# Patient Record
Sex: MALE | Race: WHITE | Employment: OTHER | URBAN - METROPOLITAN AREA
[De-identification: names, ages, dates, MRNs, and addresses within clinical notes are randomized per-mention and may not be internally consistent; named-entity substitution may affect disease eponyms.]

---

## 2019-08-18 ENCOUNTER — APPOINTMENT (OUTPATIENT)
Dept: GENERAL RADIOLOGY | Facility: HOSPITAL | Age: 69
DRG: 570 | End: 2019-08-18
Attending: EMERGENCY MEDICINE
Payer: MEDICARE

## 2019-08-18 ENCOUNTER — HOSPITAL ENCOUNTER (INPATIENT)
Facility: HOSPITAL | Age: 69
LOS: 7 days | Discharge: NURSING, REHAB OR LONG-TERM CARE | DRG: 570 | End: 2019-08-25
Attending: EMERGENCY MEDICINE | Admitting: INTERNAL MEDICINE
Payer: MEDICARE

## 2019-08-18 DIAGNOSIS — L03.90 CELLULITIS, UNSPECIFIED CELLULITIS SITE: ICD-10-CM

## 2019-08-18 DIAGNOSIS — R53.1 WEAKNESS GENERALIZED: ICD-10-CM

## 2019-08-18 DIAGNOSIS — L97.512 SKIN ULCER OF BOTH FEET WITH FAT LAYER EXPOSED (HCC): Primary | ICD-10-CM

## 2019-08-18 DIAGNOSIS — Z74.01 BEDRIDDEN: ICD-10-CM

## 2019-08-18 DIAGNOSIS — L02.611 ABSCESS OF RIGHT FOOT: ICD-10-CM

## 2019-08-18 DIAGNOSIS — D64.9 ANEMIA, UNSPECIFIED TYPE: ICD-10-CM

## 2019-08-18 DIAGNOSIS — M06.9 RHEUMATOID ARTHRITIS, INVOLVING UNSPECIFIED SITE, UNSPECIFIED RHEUMATOID FACTOR PRESENCE: ICD-10-CM

## 2019-08-18 DIAGNOSIS — E86.0 DEHYDRATION: ICD-10-CM

## 2019-08-18 DIAGNOSIS — L97.522 SKIN ULCER OF BOTH FEET WITH FAT LAYER EXPOSED (HCC): Primary | ICD-10-CM

## 2019-08-18 DIAGNOSIS — N30.00 ACUTE CYSTITIS WITHOUT HEMATURIA: ICD-10-CM

## 2019-08-18 DIAGNOSIS — R00.1 BRADYCARDIA: ICD-10-CM

## 2019-08-18 PROBLEM — R79.89 AZOTEMIA: Status: ACTIVE | Noted: 2019-08-18

## 2019-08-18 PROBLEM — R73.9 HYPERGLYCEMIA: Status: ACTIVE | Noted: 2019-08-18

## 2019-08-18 LAB
ALBUMIN SERPL-MCNC: 2.5 G/DL (ref 3.4–5)
ALBUMIN/GLOB SERPL: 0.7 {RATIO} (ref 1–2)
ALP LIVER SERPL-CCNC: 131 U/L (ref 45–117)
ALT SERPL-CCNC: 29 U/L (ref 16–61)
ANION GAP SERPL CALC-SCNC: 5 MMOL/L (ref 0–18)
ANION GAP SERPL CALC-SCNC: 6 MMOL/L (ref 0–18)
AST SERPL-CCNC: 30 U/L (ref 15–37)
BASOPHILS # BLD AUTO: 0.01 X10(3) UL (ref 0–0.2)
BASOPHILS NFR BLD AUTO: 0.2 %
BILIRUB SERPL-MCNC: 0.4 MG/DL (ref 0.1–2)
BILIRUB UR QL: NEGATIVE
BUN BLD-MCNC: 29 MG/DL (ref 7–18)
BUN BLD-MCNC: 30 MG/DL (ref 7–18)
BUN/CREAT SERPL: 34.1 (ref 10–20)
BUN/CREAT SERPL: 35.3 (ref 10–20)
CALCIUM BLD-MCNC: 9.1 MG/DL (ref 8.5–10.1)
CALCIUM BLD-MCNC: 9.2 MG/DL (ref 8.5–10.1)
CHLORIDE SERPL-SCNC: 108 MMOL/L (ref 98–112)
CHLORIDE SERPL-SCNC: 109 MMOL/L (ref 98–112)
CO2 SERPL-SCNC: 28 MMOL/L (ref 21–32)
CO2 SERPL-SCNC: 29 MMOL/L (ref 21–32)
COLOR UR: YELLOW
CREAT BLD-MCNC: 0.85 MG/DL (ref 0.7–1.3)
CREAT BLD-MCNC: 0.85 MG/DL (ref 0.7–1.3)
CRP SERPL-MCNC: 1.68 MG/DL (ref ?–0.3)
DEPRECATED RDW RBC AUTO: 71.8 FL (ref 35.1–46.3)
EOSINOPHIL # BLD AUTO: 0.02 X10(3) UL (ref 0–0.7)
EOSINOPHIL NFR BLD AUTO: 0.4 %
ERYTHROCYTE [DISTWIDTH] IN BLOOD BY AUTOMATED COUNT: 20.3 % (ref 11–15)
ERYTHROCYTE [SEDIMENTATION RATE] IN BLOOD: 90 MM/HR (ref 0–20)
GLOBULIN PLAS-MCNC: 3.8 G/DL (ref 2.8–4.4)
GLUCOSE BLD-MCNC: 113 MG/DL (ref 70–99)
GLUCOSE BLD-MCNC: 114 MG/DL (ref 70–99)
GLUCOSE BLDC GLUCOMTR-MCNC: 180 MG/DL (ref 70–99)
GLUCOSE BLDC GLUCOMTR-MCNC: 62 MG/DL (ref 70–99)
GLUCOSE UR-MCNC: NEGATIVE MG/DL
HCT VFR BLD AUTO: 29.9 % (ref 39–53)
HGB BLD-MCNC: 9.2 G/DL (ref 13–17.5)
HGB UR QL STRIP.AUTO: NEGATIVE
IMM GRANULOCYTES # BLD AUTO: 0.01 X10(3) UL (ref 0–1)
IMM GRANULOCYTES NFR BLD: 0.2 %
KETONES UR-MCNC: NEGATIVE MG/DL
LYMPHOCYTES # BLD AUTO: 0.57 X10(3) UL (ref 1–4)
LYMPHOCYTES NFR BLD AUTO: 10.1 %
M PROTEIN MFR SERPL ELPH: 6.3 G/DL (ref 6.4–8.2)
MCH RBC QN AUTO: 31 PG (ref 26–34)
MCHC RBC AUTO-ENTMCNC: 30.8 G/DL (ref 31–37)
MCV RBC AUTO: 100.7 FL (ref 80–100)
MONOCYTES # BLD AUTO: 0.38 X10(3) UL (ref 0.1–1)
MONOCYTES NFR BLD AUTO: 6.7 %
NEUTROPHILS # BLD AUTO: 4.66 X10 (3) UL (ref 1.5–7.7)
NEUTROPHILS # BLD AUTO: 4.66 X10(3) UL (ref 1.5–7.7)
NEUTROPHILS NFR BLD AUTO: 82.4 %
NITRITE UR QL STRIP.AUTO: NEGATIVE
OSMOLALITY SERPL CALC.SUM OF ELEC: 301 MOSM/KG (ref 275–295)
OSMOLALITY SERPL CALC.SUM OF ELEC: 303 MOSM/KG (ref 275–295)
PH UR: 6 [PH] (ref 5–8)
PLATELET # BLD AUTO: 173 10(3)UL (ref 150–450)
PLATELET MORPHOLOGY: NORMAL
POTASSIUM SERPL-SCNC: 4.5 MMOL/L (ref 3.5–5.1)
POTASSIUM SERPL-SCNC: 4.5 MMOL/L (ref 3.5–5.1)
PROT UR-MCNC: 30 MG/DL
RBC # BLD AUTO: 2.97 X10(6)UL (ref 3.8–5.8)
RBC #/AREA URNS AUTO: 2 /HPF
SODIUM SERPL-SCNC: 142 MMOL/L (ref 136–145)
SODIUM SERPL-SCNC: 143 MMOL/L (ref 136–145)
SP GR UR STRIP: 1.02 (ref 1–1.03)
UROBILINOGEN UR STRIP-ACNC: <2
VIT C UR-MCNC: 40 MG/DL
WBC # BLD AUTO: 5.7 X10(3) UL (ref 4–11)
WBC #/AREA URNS AUTO: 42 /HPF

## 2019-08-18 PROCEDURE — 73610 X-RAY EXAM OF ANKLE: CPT | Performed by: EMERGENCY MEDICINE

## 2019-08-18 RX ORDER — DEXTROSE MONOHYDRATE 25 G/50ML
INJECTION, SOLUTION INTRAVENOUS
Status: COMPLETED
Start: 2019-08-18 | End: 2019-08-18

## 2019-08-18 RX ORDER — MULTIVIT WITH MINERALS/LUTEIN
2000 TABLET ORAL DAILY
COMMUNITY

## 2019-08-18 RX ORDER — GARLIC EXTRACT 500 MG
1 CAPSULE ORAL 2 TIMES DAILY
COMMUNITY

## 2019-08-18 RX ORDER — METHYLPREDNISOLONE 4 MG
4 TABLET, DOSE PACK ORAL DAILY
COMMUNITY

## 2019-08-18 NOTE — ED PROVIDER NOTES
Patient Seen in: [facility] AND [facility] Emergency Department    History   Patient presents with:  Wound    Stated Complaint: WORSENING FOOT ULCERS    HPI    Patient presents the emergency department today with multiple problems.   He has increasing pain and ul SpO2 96%   BMI 18.32 kg/m²         Physical Exam   Constitutional: He is oriented to person, place, and time. He appears well-developed and well-nourished. No distress. HENT:   Head: Normocephalic.    Eyes: Conjunctivae and EOM are normal.   Neck: Dawna Music Abnormal; Notable for the following components:    Sed Rate 90 (*)     All other components within normal limits   RBC MORPHOLOGY SCAN - Abnormal; Notable for the following components:    RBC Morphology See morphology below (*)     All other components wit the following components:    Vitamin B12 >2,000 (*)     All other components within normal limits   BASIC METABOLIC PANEL (8) - Abnormal; Notable for the following components:    Sodium 146 (*)     Potassium 5.2 (*)     BUN 24 (*)     BUN/CREA Ratio 33.8 ( the following components:    POC Glucose  138 (*)     All other components within normal limits   POCT GLUCOSE - Abnormal; Notable for the following components:    POC Glucose  155 (*)     All other components within normal limits   POCT GLUCOSE - Abnormal components within normal limits   AEROBIC BACTERIAL CULTURE - Abnormal; Notable for the following components:    Aerobic Culture Result 3+ growth Staphylococcus aureus, MRSA (*)     Aerobic Culture Result Gram Negative Alphonso (*)     All other components with orders. CBC WITH DIFFERENTIAL WITH PLATELET    Narrative: The following orders were created for panel order CBC WITH DIFFERENTIAL WITH PLATELET.   Procedure                               Abnormality         Status                     --------- need for follow up      Admission disposition: 8/18/2019  6:50 PM         Discussed W/ Dr. Mari Hayes. Will give Rocephin for UTI.  Admit to monitored bed for bradycardia    EKG    Indication: weakness  Rhythm: sinus bradycardia  Comparison: No old EKG for Comp (Principal) Skin ulcer of both feet with fat layer exposed ([facility] [address] L97.512, L97.522 8/18/2019 Yes    Abscess of right foot L02.611 Unknown Yes    Acute cystitis without hematuria N30.00 8/18/2019 Yes    Anemia, unspecified type D64.9 8/18/2019 Yes    Azotemi

## 2019-08-19 LAB
ANION GAP SERPL CALC-SCNC: 2 MMOL/L (ref 0–18)
BASOPHILS # BLD AUTO: 0.01 X10(3) UL (ref 0–0.2)
BASOPHILS NFR BLD AUTO: 0.2 %
BUN BLD-MCNC: 28 MG/DL (ref 7–18)
BUN/CREAT SERPL: 35 (ref 10–20)
CALCIUM BLD-MCNC: 8.8 MG/DL (ref 8.5–10.1)
CHLORIDE SERPL-SCNC: 112 MMOL/L (ref 98–112)
CO2 SERPL-SCNC: 31 MMOL/L (ref 21–32)
CREAT BLD-MCNC: 0.8 MG/DL (ref 0.7–1.3)
DEPRECATED RDW RBC AUTO: 74.6 FL (ref 35.1–46.3)
EOSINOPHIL # BLD AUTO: 0.02 X10(3) UL (ref 0–0.7)
EOSINOPHIL NFR BLD AUTO: 0.4 %
ERYTHROCYTE [DISTWIDTH] IN BLOOD BY AUTOMATED COUNT: 20.5 % (ref 11–15)
EST. AVERAGE GLUCOSE BLD GHB EST-MCNC: 94 MG/DL (ref 68–126)
GLUCOSE BLD-MCNC: 70 MG/DL (ref 70–99)
GLUCOSE BLDC GLUCOMTR-MCNC: 107 MG/DL (ref 70–99)
GLUCOSE BLDC GLUCOMTR-MCNC: 111 MG/DL (ref 70–99)
GLUCOSE BLDC GLUCOMTR-MCNC: 112 MG/DL (ref 70–99)
GLUCOSE BLDC GLUCOMTR-MCNC: 79 MG/DL (ref 70–99)
HBA1C MFR BLD HPLC: 4.9 % (ref ?–5.7)
HCT VFR BLD AUTO: 27.9 % (ref 39–53)
HGB BLD-MCNC: 8.6 G/DL (ref 13–17.5)
IMM GRANULOCYTES # BLD AUTO: 0.02 X10(3) UL (ref 0–1)
IMM GRANULOCYTES NFR BLD: 0.4 %
LYMPHOCYTES # BLD AUTO: 0.44 X10(3) UL (ref 1–4)
LYMPHOCYTES NFR BLD AUTO: 9.3 %
MCH RBC QN AUTO: 31.2 PG (ref 26–34)
MCHC RBC AUTO-ENTMCNC: 30.8 G/DL (ref 31–37)
MCV RBC AUTO: 101.1 FL (ref 80–100)
MONOCYTES # BLD AUTO: 0.37 X10(3) UL (ref 0.1–1)
MONOCYTES NFR BLD AUTO: 7.8 %
NEUTROPHILS # BLD AUTO: 3.86 X10 (3) UL (ref 1.5–7.7)
NEUTROPHILS # BLD AUTO: 3.86 X10(3) UL (ref 1.5–7.7)
NEUTROPHILS NFR BLD AUTO: 81.9 %
OSMOLALITY SERPL CALC.SUM OF ELEC: 304 MOSM/KG (ref 275–295)
PLATELET # BLD AUTO: 158 10(3)UL (ref 150–450)
POTASSIUM SERPL-SCNC: 4.7 MMOL/L (ref 3.5–5.1)
RBC # BLD AUTO: 2.76 X10(6)UL (ref 3.8–5.8)
SODIUM SERPL-SCNC: 145 MMOL/L (ref 136–145)
TSI SER-ACNC: 11.9 MIU/ML (ref 0.36–3.74)
WBC # BLD AUTO: 4.7 X10(3) UL (ref 4–11)

## 2019-08-19 RX ORDER — HEPARIN SODIUM 5000 [USP'U]/ML
5000 INJECTION, SOLUTION INTRAVENOUS; SUBCUTANEOUS EVERY 12 HOURS SCHEDULED
Status: DISCONTINUED | OUTPATIENT
Start: 2019-08-19 | End: 2019-08-25

## 2019-08-19 RX ORDER — GARLIC EXTRACT 500 MG
1 CAPSULE ORAL 2 TIMES DAILY
Status: DISCONTINUED | OUTPATIENT
Start: 2019-08-19 | End: 2019-08-25

## 2019-08-19 RX ORDER — SODIUM CHLORIDE 9 MG/ML
INJECTION, SOLUTION INTRAVENOUS CONTINUOUS
Status: DISCONTINUED | OUTPATIENT
Start: 2019-08-19 | End: 2019-08-20

## 2019-08-19 NOTE — WOUND PROGRESS NOTE
Spoke to Dr. Renteria Ser re wound consult/recs and need for podiatry consult and to advise cultures were taken. MD asked me to call Dr. Tanya Gilbert which I did.  Voice mail message left for podiatry consult/need for toenail clipping/possible wound margin biopsy to

## 2019-08-19 NOTE — H&P
[address] Patient Status:  Inpatient    1/15/1950 MRN [id number]   Location [facility] 3W/SW Attending Abdi Mclaughlin MD   [facility] Day # 1 PCP Farideh Ventura MD, MD     Note entered Switch to definitive coverage as indicated, narrow spectrum and oral if possible, pending report on results of urine culture. Genital •    CV •    Lymph •    Heme • Anemia - Macrocytic. Present prior to rehydration.   Anticipate worsening by hemodilution

## 2019-08-19 NOTE — ED NOTES
Orders for admission, patient is aware of plan and ready to go upstairs.  Any questions, please call ED RN Myra Grewal at extension [phone]

## 2019-08-19 NOTE — WOUND PROGRESS NOTE
WOUND CARE NOTE      PLAN   Recommendations:  May consider surgical evaluation  Dietary consult for recommendations for nutrition to optimize wound healing  Diabetic Education for optimal glucose control  Turn schedules  Heels elevated using pillowsallan was used to locate pedal pulses which were auscultated on the left but could not be located on the right. Pt declined posterior assessment.  Dr. Krystal Khan paged to discuss wound recommendations: Santyl to bilateral ankle ulcers, xeroform to the skin tears, bi

## 2019-08-19 NOTE — PLAN OF CARE
Problem: Patient Centered Care  Goal: Patient preferences are identified and integrated in the patient's plan of care  Description  Interventions:  - What would you like us to know as we care for you? Wheelchair bound at home with sister and mother.   - P for physical needs  - Identify cognitive and physical deficits and behaviors that affect risk of falls.   - [place] fall precautions as indicated by assessment.  - Educate pt/family on patient safety including physical limitations  - Instruct pt to call f Angina  - Evaluate fluid balance, assess for edema, trend weights  Outcome: Progressing  Goal: Absence of cardiac arrhythmias or at baseline  Description  INTERVENTIONS:  - Continuous cardiac monitoring, monitor vital signs, obtain 12 lead EKG if indicated needed  - Follow urinary retention protocol/standard of care  - Consider collaborating with pharmacy to review patient's medication profile  - Implement strategies to promote bladder emptying  Outcome: Progressing     Problem: METABOLIC/FLUID AND ELECTROLY patients to use assistive/communication devices  Outcome: Progressing     Problem: SKIN/TISSUE INTEGRITY - ADULT  Goal: Skin integrity remains intact  Description  INTERVENTIONS  - Assess and document risk factors for pressure ulcer development  - Assess a

## 2019-08-19 NOTE — PLAN OF CARE
Heart rate WNL, IV fluids, IV abx, turn q2h, educated pt to use call light for assistance.     Problem: Patient Centered Care  Goal: Patient preferences are identified and integrated in the patient's plan of care  Description  Interventions:  - What would y response  - Implement non-pharmacological measures as appropriate and evaluate response  - Consider cultural and social influences on pain and pain management  - Manage/alleviate anxiety  - Utilize distraction and/or relaxation techniques  - Monitor for op system  Outcome: Progressing     Problem: CARDIOVASCULAR - ADULT  Goal: Maintains optimal cardiac output and hemodynamic stability  Description  INTERVENTIONS:  - Monitor vital signs, rhythm, and trends  - Monitor for bleeding, hypotension and signs of dec prevention bundle as indicated  Outcome: Progressing     Problem: Impaired Activities of Daily Living  Goal: Achieve highest/safest level of independence in self care  Description  Interventions:  - Assess ability and encourage patient to participate in AD

## 2019-08-19 NOTE — PLAN OF CARE
Problem: Patient Centered Care  Goal: Patient preferences are identified and integrated in the patient's plan of care  Description  Interventions:  - What would you like us to know as we care for you?  Patient wants to be kept updated on POC  - Provide ti influences on pain and pain management  - Manage/alleviate anxiety  - Utilize distraction and/or relaxation techniques  - Monitor for opioid side effects  - Notify MD/LIP if interventions unsuccessful or patient reports new pain  - Anticipate increased naomi stability  Description  INTERVENTIONS:  - Monitor vital signs, rhythm, and trends  - Monitor for bleeding, hypotension and signs of decreased cardiac output  - Evaluate effectiveness of vasoactive medications to optimize hemodynamic stability  - Monitor ar Evaluate effectiveness of GI medications  - Encourage mobilization and activity  - Obtain nutritional consult as needed  - Establish a toileting routine/schedule  - Consider collaborating with pharmacy to review patient's medication profile  Outcome: Progr development  - Assess and document skin integrity  - Monitor for areas of redness and/or skin breakdown  - Initiate interventions, skin care algorithm/standards of care as needed  Outcome: Progressing  Goal: Incision(s), wounds(s) or drain site(s) healing ADLs such as feeding, grooming, and bathing  - Educate and encourage patient/family in tolerated functional activity level and precautions during self-care  Outcome: Progressing     Problem: CARDIOVASCULAR - ADULT  Goal: Absence of cardiac arrhythmias or a

## 2019-08-19 NOTE — CONSULTS
[facility]    Report of Consultation    Mai Kapadia Patient Status:  Inpatient    1/15/1950 MRN [id number]   Location [facility] 3W/SW Attending Jaswant Grewal MD   [address] Day # 1 PCP Yi Sagastume MD, MD     Date of Admission:   Units by mouth daily. Calcium-Magnesium-Zinc 500-250-12.5 MG Oral Tab Take 3 tablets by mouth daily. Glucosamine Sulfate 500 MG Oral Tab Take 4 tablets by mouth daily. Acidophilus/Pectin Oral Cap Take 1 capsule by mouth 2 (two) times daily.        All 8.8 08/19/2019    ALB 2.5 (L) 08/18/2019    ALKPHO 131 (H) 08/18/2019    TP 6.3 (L) 08/18/2019    AST 30 08/18/2019    ALT 29 08/18/2019    TSH 11.900 (H) 08/19/2019    ESRML 90 (H) 08/18/2019    CRP 1.68 (H) 08/18/2019         Imaging:  Xr Ankle (min 3 Vi

## 2019-08-20 ENCOUNTER — APPOINTMENT (OUTPATIENT)
Dept: CV DIAGNOSTICS | Facility: HOSPITAL | Age: 69
DRG: 570 | End: 2019-08-20
Attending: INTERNAL MEDICINE
Payer: MEDICARE

## 2019-08-20 LAB
ALBUMIN SERPL-MCNC: 2 G/DL (ref 3.4–5)
ALBUMIN/GLOB SERPL: 0.5 {RATIO} (ref 1–2)
ALP LIVER SERPL-CCNC: 122 U/L (ref 45–117)
ALT SERPL-CCNC: 21 U/L (ref 16–61)
ANION GAP SERPL CALC-SCNC: 4 MMOL/L (ref 0–18)
AST SERPL-CCNC: 19 U/L (ref 15–37)
BILIRUB SERPL-MCNC: 0.3 MG/DL (ref 0.1–2)
BUN BLD-MCNC: 19 MG/DL (ref 7–18)
BUN/CREAT SERPL: 31.1 (ref 10–20)
CALCIUM BLD-MCNC: 8.4 MG/DL (ref 8.5–10.1)
CHLORIDE SERPL-SCNC: 114 MMOL/L (ref 98–112)
CO2 SERPL-SCNC: 28 MMOL/L (ref 21–32)
CREAT BLD-MCNC: 0.61 MG/DL (ref 0.7–1.3)
DEPRECATED RDW RBC AUTO: 77 FL (ref 35.1–46.3)
ERYTHROCYTE [DISTWIDTH] IN BLOOD BY AUTOMATED COUNT: 21.2 % (ref 11–15)
GLOBULIN PLAS-MCNC: 3.7 G/DL (ref 2.8–4.4)
GLUCOSE BLD-MCNC: 70 MG/DL (ref 70–99)
GLUCOSE BLDC GLUCOMTR-MCNC: 122 MG/DL (ref 70–99)
GLUCOSE BLDC GLUCOMTR-MCNC: 138 MG/DL (ref 70–99)
GLUCOSE BLDC GLUCOMTR-MCNC: 65 MG/DL (ref 70–99)
GLUCOSE BLDC GLUCOMTR-MCNC: 70 MG/DL (ref 70–99)
HCT VFR BLD AUTO: 27.8 % (ref 39–53)
HGB BLD-MCNC: 8.4 G/DL (ref 13–17.5)
HGB RETIC QN AUTO: 31 PG (ref 28.2–36.6)
IMM RETICS NFR: 0.07 RATIO (ref 0.1–0.3)
IRON SATURATION: 34 % (ref 20–50)
IRON SERPL-MCNC: 98 UG/DL (ref 65–175)
M PROTEIN MFR SERPL ELPH: 5.7 G/DL (ref 6.4–8.2)
MCH RBC QN AUTO: 30.5 PG (ref 26–34)
MCHC RBC AUTO-ENTMCNC: 30.2 G/DL (ref 31–37)
MCV RBC AUTO: 101.1 FL (ref 80–100)
OSMOLALITY SERPL CALC.SUM OF ELEC: 303 MOSM/KG (ref 275–295)
PLATELET # BLD AUTO: 148 10(3)UL (ref 150–450)
POTASSIUM SERPL-SCNC: 4.3 MMOL/L (ref 3.5–5.1)
RBC # BLD AUTO: 2.75 X10(6)UL (ref 3.8–5.8)
RETICS # AUTO: 46.5 X10(3) UL (ref 22.5–147.5)
RETICS/RBC NFR AUTO: 1.6 % (ref 0.5–2.5)
SODIUM SERPL-SCNC: 146 MMOL/L (ref 136–145)
TOTAL IRON BINDING CAPACITY: 289 UG/DL (ref 240–450)
TRANSFERRIN SERPL-MCNC: 194 MG/DL (ref 200–360)
VIT B12 SERPL-MCNC: >2000 PG/ML (ref 193–986)
WBC # BLD AUTO: 3.5 X10(3) UL (ref 4–11)

## 2019-08-20 PROCEDURE — 93306 TTE W/DOPPLER COMPLETE: CPT | Performed by: INTERNAL MEDICINE

## 2019-08-20 PROCEDURE — 99221 1ST HOSP IP/OBS SF/LOW 40: CPT | Performed by: PODIATRIST

## 2019-08-20 RX ORDER — DEXTROSE AND SODIUM CHLORIDE 5; .45 G/100ML; G/100ML
INJECTION, SOLUTION INTRAVENOUS CONTINUOUS
Status: DISCONTINUED | OUTPATIENT
Start: 2019-08-20 | End: 2019-08-22

## 2019-08-20 RX ORDER — LEVOTHYROXINE SODIUM 0.05 MG/1
50 TABLET ORAL
Status: DISCONTINUED | OUTPATIENT
Start: 2019-08-20 | End: 2019-08-25

## 2019-08-20 NOTE — PROGRESS NOTES
Progress note    24 h events kaitlynn with PACs         Current Medications:    Current Facility-Administered Medications:  Levothyroxine Sodium (SYNTHROID) tab 50 mcg 50 mcg Oral Before breakfast   dextrose 5 %-0.45 % NaCl infusion  Intravenous Continuous Date    WBC 4.7 08/19/2019    HGB 8.6 (L) 08/19/2019    HCT 27.9 (L) 08/19/2019    .0 08/19/2019    CREATSERUM 0.80 08/19/2019    BUN 28 (H) 08/19/2019     08/19/2019    K 4.7 08/19/2019     08/19/2019    CO2 31.0 08/19/2019    GLU 70 08 patient.     Adan Carter  8/19/2019

## 2019-08-20 NOTE — PLAN OF CARE
Problem: Patient Centered Care  Goal: Patient preferences are identified and integrated in the patient's plan of care  Description  Interventions:  - What would you like us to know as we care for you?  Right-handed but need things (phone, belongings) clos Consider cultural and social influences on pain and pain management  - Manage/alleviate anxiety  - Utilize distraction and/or relaxation techniques  - Monitor for opioid side effects  - Notify MD/LIP if interventions unsuccessful or patient reports new naomi output and hemodynamic stability  Description  INTERVENTIONS:  - Monitor vital signs, rhythm, and trends  - Monitor for bleeding, hypotension and signs of decreased cardiac output  - Evaluate effectiveness of vasoactive medications to optimize hemodynamic document skin integrity  - Assess and document dressing/incision, wound bed, drain sites and surrounding tissue  - Implement wound care per orders  - Initiate isolation precautions as appropriate  - Initiate Pressure Ulcer prevention bundle as indicated  O

## 2019-08-20 NOTE — CONSULTS
[facility]    Report of Consultation    Kayla Tavarez Patient Status:  Inpatient    1/15/1950 MRN [id number]   Location [facility] 3W/SW Attending Haydee Libman, MD   [facility] Day # 2 PCP Dominguez Sweet MD, MD     Date of Admission:   (ROCEPHIN) 1 g in sodium chloride 0.9% 100 mL MBP/ADD-vantage, 1 g, Intravenous, Q24H  •  collagenase (SANTYL) 250 UNIT/GM ointment, , Topical, Daily    Review of Systems:  Musculoskeletal:negative, the patient is contracted on his right foot left also has prominence. Correlate clinically. Dictated by (CST): Eve Dodson MD on 8/18/2019 at 18:28     Approved by (CST): Eve Dodson MD on 8/18/2019 at 18:32          Xr Ankle (min 3 Views), Right (cpt=73610)    Result Date: 8/18/2019  CONCLUSION:  1.  No the development of these wounds although they appear to be stasis in origin at least the larger ones. The ulcer on the plantar medial right foot seems to be from pressure and/or injury.   After discussing with Dr. Chele Gan I will have Dr. Martín Spangler consulted

## 2019-08-20 NOTE — DIETARY NOTE
ADULT NUTRITION INITIAL ASSESSMENT    Pt is at high nutrition risk. Pt meets severe malnutrition criteria.       CRITERIA FOR MALNUTRITION DIAGNOSIS:  Criteria for severe malnutrition diagnosis: chronic illness related to body fat severe depletion and musc Ca-Mag-Zn, & glucosamine at home.   Probiotic in hospital.  - Feeding assistance: meal set up  - Nutrition education: assess education needs  - Coordination of nutrition care: collaboration with other providers  - Discharge and transfer of nutrition care to yogurt & cheese in moderation.   Cultural/Ethnic/Episcopalian Preferences: None    MEDICATIONS: reviewed  • Levothyroxine Sodium  50 mcg Oral Before breakfast   • Acidophilus/Pectin  1 capsule Oral BID   • Heparin Sodium (Porcine)  5,000 Units Subcutaneous 2 t PO intake, adequacy of supplement intake and tolerance of supplement intake.   - Food and Nutrient Administration:      Monitor: need for temporary enteral nutrition  - Anthropometric Measurement:      Monitor: wt   - Nutrition Goals:      halt wt loss, reg

## 2019-08-20 NOTE — PROGRESS NOTES
[address] [facility] Patient Status:  Inpatient    1/15/1950 MRN [id number]   Location [facility] 3W/SW Attending Eddie Alvarenga MD   [facility] Day # 2 PCP Sienna Lowe MD, MD     Note entered on 19.   Jake pending report on results of urine culture. Genital ·     CV · Bradycardia - Asymptomatic, with PACs. Expect increase in heart rate with thyroid hormone replacement therapy. Lymph ·     Heme · Anemia - Macrocytic. Present prior to rehydration.   Antic

## 2019-08-21 ENCOUNTER — APPOINTMENT (OUTPATIENT)
Dept: ULTRASOUND IMAGING | Facility: HOSPITAL | Age: 69
DRG: 570 | End: 2019-08-21
Attending: PODIATRIST
Payer: MEDICARE

## 2019-08-21 LAB
ANION GAP SERPL CALC-SCNC: 8 MMOL/L (ref 0–18)
BUN BLD-MCNC: 24 MG/DL (ref 7–18)
BUN/CREAT SERPL: 33.8 (ref 10–20)
CALCIUM BLD-MCNC: 8.5 MG/DL (ref 8.5–10.1)
CHLORIDE SERPL-SCNC: 111 MMOL/L (ref 98–112)
CO2 SERPL-SCNC: 27 MMOL/L (ref 21–32)
CREAT BLD-MCNC: 0.71 MG/DL (ref 0.7–1.3)
FOLATE SERPL-MCNC: 14.6 NG/ML (ref 8.7–?)
GLUCOSE BLD-MCNC: 90 MG/DL (ref 70–99)
GLUCOSE BLDC GLUCOMTR-MCNC: 155 MG/DL (ref 70–99)
GLUCOSE BLDC GLUCOMTR-MCNC: 79 MG/DL (ref 70–99)
GLUCOSE BLDC GLUCOMTR-MCNC: 79 MG/DL (ref 70–99)
GLUCOSE BLDC GLUCOMTR-MCNC: 96 MG/DL (ref 70–99)
OSMOLALITY SERPL CALC.SUM OF ELEC: 306 MOSM/KG (ref 275–295)
POTASSIUM SERPL-SCNC: 5.2 MMOL/L (ref 3.5–5.1)
SODIUM SERPL-SCNC: 146 MMOL/L (ref 136–145)

## 2019-08-21 PROCEDURE — 93925 LOWER EXTREMITY STUDY: CPT | Performed by: PODIATRIST

## 2019-08-21 NOTE — PROGRESS NOTES
Progress note    24 h events kaitlynn with PACs         Current Medications:    Current Facility-Administered Medications:  Levothyroxine Sodium (SYNTHROID) tab 50 mcg 50 mcg Oral Before breakfast   dextrose 5 %-0.45 % NaCl infusion  Intravenous Continuous Value Date    WBC 3.5 (L) 08/20/2019    HGB 8.4 (L) 08/20/2019    HCT 27.8 (L) 08/20/2019    .0 (L) 08/20/2019    CREATSERUM 0.71 08/21/2019    BUN 24 (H) 08/21/2019     (H) 08/21/2019    K 5.2 (H) 08/21/2019     08/21/2019    CO2 27.0 0

## 2019-08-21 NOTE — PROGRESS NOTES
[address] [facility] Dosing Service    Initial Pharmacokinetic Consult for Vancomycin Dosing     Fide Randall is a 81year old male who is being treated for osteomyelitis.   Pharmacy has been asked to dose Vancomycin by Dr. Renee Lancaster    He is allergic to penicillins; Tetracycline <=1 Sensitive      Trimethoprim/Sulfa <=10 Sensitive      Vancomycin 1 Sensitive    3.  URINE CULTURE, ROUTINE     Status: Abnormal    Collection Time: 08/18/19  6:14 PM   Result Value Ref Range    Urine Culture (A) N/A     50,000-99,000 CFU/M

## 2019-08-21 NOTE — PROGRESS NOTES
[address] [facility] Patient Status:  Inpatient    1/15/1950 MRN [id number]   Location [facility] 3W/SW Attending Larena Brunner, MD   [facility] Day # 3 PCP Laureen Correa MD, MD     Note entered on 19.   Jake indicated, narrow spectrum and oral if possible, pending report on results of urine culture. In view of osteomyelitis right foot, consult ID. Genital ·     CV · Bradycardia - Asymptomatic, with PACs. Resolved with thyroid hormone replacement therapy.

## 2019-08-21 NOTE — OCCUPATIONAL THERAPY NOTE
OCCUPATIONAL THERAPY EVALUATION - INPATIENT      Room Number: 329/329-A  Evaluation Date: 8/21/2019  Type of Evaluation: Initial  Presenting Problem: (foot ulcers, old right ankle fracture)    Physician Order: IP Consult to Occupational Therapy  Reason for eat breakfast. The patient's Approx Degree of Impairment: 56.46% has been calculated based on documentation in the [facility] '6 clicks' Inpatient Daily Activity Short Form. Research supports that patients with this level of impairment may benefit from TOMAS.  OT setup  Assistive Device(s) Used: see above     Prior Level of [place]: Prior to admission, patient was living at home with his sister and mother. Patient's sister cares for his mother who has advanced dementia.  Patient was managing his own basic self- NT  Car Transfer: NT    Bedroom Mobility: NT     BALANCE ASSESSMENT  Static Sitting: fair  Dynamic Sitting: fair  Static Standing: NT  Dynamic Standing: NT    FUNCTIONAL ADL ASSESSMENT  Grooming: min a   Feeding: min a   Bathing: max a   Toileting: max a

## 2019-08-21 NOTE — PHYSICAL THERAPY NOTE
Chart reviewed, discussed case with RN. Pt with B foot wounds, possible OM. Per Dr Romi Cam note, recommending R foot I&D vs BKA. RN reports pt is refusing any sx intervention.  X-ray R foot with fx of 2 horizontal screws from previous arthrodesis and fusi

## 2019-08-21 NOTE — CONSULTS
[facility]    PATIENT'S NAME: Brittney Sheriff   ATTENDING PHYSICIAN: Sergio Craft.  Krzysztof Villareal MD   CONSULTING PHYSICIAN: Jennifer Weston MD   PATIENT ACCOUNT#:   [id number]    LOCATION:  [address] [place] [address] RECORD #:   [id number]       DATE OF BIRTH:  [birth year] Radiographs of the right ankle were reviewed, which demonstrates status post triple arthrodesis with deformity, multiple screws throughout the foot. IMPRESSION:    1. Right equinovalgus deformity. 2.   Right foot abscess.     3.   Rheumatoid arthrit

## 2019-08-21 NOTE — DIETARY NOTE
NUTRITION NOTE     Medical Food Supplements(ONS)-RD added greek yogurt at breakfast, strawberry Ensure at 2 pm and vanilla Ensure Enlive at HS.      NUTRITION PRESCRIPTION:  Diet: Regular/General  Oral Supplements: as above   ESTIMATED NUTRITION NEEDS:  Christopher

## 2019-08-21 NOTE — PHYSICAL THERAPY NOTE
PHYSICAL THERAPY EVALUATION - INPATIENT     Room Number: 329/329-A  Evaluation Date: 8/21/2019  Type of Evaluation: Initial   Physician Order: PT Eval and Treat    Presenting Problem: p/w B foot ulcers, sinus bradycardia, UTI anemia  Reason for Therapy: M include B foot ulcers with possible OM, decreased activity tolerance, generalized weakness, balance deficits, multi-joint laxity/deformity related to hx RA, which are below the patient's pre-admission status. The patient's Approx Degree of Impairment: 86. 6 Surgical History:   Procedure Laterality Date   • ARTHROPLASTY ANKLE JOINT Right    • HIP REPLACEMENT SURGERY Right        HOME SITUATION  Type of Home: House   Home Layout: One level  Stairs to Enter : 4  Railing: Yes  Stairs to Bedroom: 0       Duncan Bellamy distress with mobility    AM-PAC '6-Clicks' INPATIENT SHORT FORM - BASIC MOBILITY  How much difficulty does the patient currently have. ..  -   Turning over in bed (including adjusting bedclothes, sheets and blankets)?: A Lot   -   Sitting down on and stand EOB x 10 min at supervision for balance to facilitate participation in ADLs   Goal #4   Current Status    Goal #5 Patient to demonstrate independence with home activity/exercise instructions provided to patient in preparation for discharge.    Goal #5   Cur

## 2019-08-21 NOTE — PROGRESS NOTES
[facility]    Report of Consultation    Nettie Moore Patient Status:  Inpatient    1/15/1950 MRN [id number]   Location [facility] 3W/SW Attending Telly Samuel MD   [facility] Day # 3 PCP Izabel Tam MD, MD     Date of Admission:   Medications:  Levothyroxine Sodium (SYNTHROID) tab 50 mcg 50 mcg Oral Before breakfast   dextrose 5 %-0.45 % NaCl infusion  Intravenous Continuous   Acidophilus/Pectin (PROBIOTIC) CAPS 1 capsule 1 capsule Oral BID   Heparin Sodium (Porcine) 5000 UNIT/ML in plantar ulcer  LLE with chronic stasis with minimal warmth  right lateral ankle and left medial ankle with very large ulcers.   No pulse palpated on the R  IV: ok  Skin: no rash  Neuro: alert and responsive  IV: ok    Results:     Laboratory Data:  Lab Resu pending    Impression:                     Vanco#0/Ceftriaxone#3  Elderly male with RA, COPD, is admitted with UTI and multiple wounds, + at R foot with abscess at plantar aspect. Superficial cx with MRSA and Providentia rettgeri, anaerobic cx pending.   Claryce Forth

## 2019-08-22 LAB
GLUCOSE BLDC GLUCOMTR-MCNC: 104 MG/DL (ref 70–99)
GLUCOSE BLDC GLUCOMTR-MCNC: 76 MG/DL (ref 70–99)

## 2019-08-22 PROCEDURE — 99231 SBSQ HOSP IP/OBS SF/LOW 25: CPT | Performed by: PODIATRIST

## 2019-08-22 NOTE — PROGRESS NOTES
08/22/19  0835   BP: 133/69   Pulse: 66   Resp: 18   Temp: 97.8 °F (36.6 °C)   Patient was seen resting comfortably in bed.     I reviewed Dr. Meghna Casper report also discussed it with him directly and we both agree that an incision and drainage is really a

## 2019-08-22 NOTE — DIETARY NOTE
NUTRITION NOTE:    Follow up pt visit to monitor PO and supplement intake, pt reports eating well and taking all supplements. PO and supplement intake for 8/21 reveals pt consuming 142% estimated Kcal/124% estimated Pro needs.  Pt reports tolerating and korin

## 2019-08-22 NOTE — CM/SW NOTE
Case Management/ Progression of Care    [facility] has accepted. Patient having an I/D today. / to remain available for support and/or discharge planning. Viviane Alicea RN Case Manager   Ext.  [phone]

## 2019-08-22 NOTE — PROGRESS NOTES
[address] [facility] Patient Status:  Inpatient    1/15/1950 MRN [id number]   Location [facility] 3W/SW Attending Inés Webber MD   [facility] Day # 4 PCP Fransisco Arguello MD, MD     Note entered on 19.   Jake supportive care. Integ · *  Skin ulcers - Chronic.  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 Will ask podiatry Dr. Carolyn Germain to collect skin biopsy specimen for rheumatoid cutaneous vasculitis.    Resp ·     GI ·     Urinary ·

## 2019-08-22 NOTE — PROGRESS NOTES
Progress note    Stable cv status. DC planning.     Current Medications:    Current Facility-Administered Medications:  cefTRIAXone Sodium (ROCEPHIN) 2 g in sodium chloride 0.9% 100 mL MBP/ADD-vantage 2 g Intravenous Q24H   Vancomycin HCl (VANCOCIN) 1,000 m edema    Results:     Laboratory Data:  Lab Results   Component Value Date    WBC 3.5 (L) 08/20/2019    HGB 8.4 (L) 08/20/2019    HCT 27.8 (L) 08/20/2019    .0 (L) 08/20/2019    CREATSERUM 0.71 08/21/2019    BUN 24 (H) 08/21/2019     (H) 08/21

## 2019-08-22 NOTE — PROGRESS NOTES
I discussed the case with Dr. Baylee Nath I read through the notes from infectious disease.   I would like to make the following comments first removing all of the bone screws as a major undertaking because several are broken and cracked these would have to be r

## 2019-08-22 NOTE — CM/SW NOTE
Case Management/ Progression of Care    Orders received for discharge planning. Patient lives at home with his mother and and sister Pita Dixon, and is home bound with  Home MD.  Physician Dr. Zachary Montejo. DME at home includes a RW and transport wheel chair.    Garrison Essex

## 2019-08-22 NOTE — OCCUPATIONAL THERAPY NOTE
Pt not seen for OT at this time secondary to pt declining, stating he needs more time to finish his meal. Will follow up with pt later in date as schedule permits.

## 2019-08-22 NOTE — PROGRESS NOTES
[facility]    Progress Note      Lorenzo Pod Patient Status:  Inpatient    1/15/1950 MRN [id number]   Location [facility] 3W/SW Attending Abdi Mclaughlin MD   Hosp Day # 4 PCP Farideh Ventura MD, MD       Subjective:   Lorenzo Duran mood and affect.      Laboratory Data:  Recent Labs   Lab 08/19/19  0611 08/20/19  1142   RBC 2.76* 2.75*   HGB 8.6* 8.4*   HCT 27.9* 27.8*   .1* 101.1*   MCH 31.2 30.5   MCHC 30.8* 30.2*   RDW 20.5* 21.2*   NEPRELIM 3.86  --    WBC 4.7 3.5*   PLT 15 plan for debridement. Please collect deep tissue or bone biopsy for pathology and cultures, consider removing hardware. Continue ceftriaxone and vancomycin until at least October 2, 2019.   Metronidazole might be needed if anaerobes are recovered and cult

## 2019-08-22 NOTE — PLAN OF CARE
Problem: Patient Centered Care  Goal: Patient preferences are identified and integrated in the patient's plan of care  Description  Interventions:  - What would you like us to know as we care for you?  Right-handed but need things (phone, belongings) clos physical limitations  - Instruct pt to call for assistance with activity based on assessment  - Modify environment to reduce risk of injury  - Provide assistive devices as appropriate  - Consider OT/PT consult to assist with strengthening/mobility  - Encou

## 2019-08-23 ENCOUNTER — ANESTHESIA EVENT (OUTPATIENT)
Dept: SURGERY | Facility: HOSPITAL | Age: 69
DRG: 570 | End: 2019-08-23
Payer: MEDICARE

## 2019-08-23 ENCOUNTER — APPOINTMENT (OUTPATIENT)
Dept: PICC SERVICES | Facility: HOSPITAL | Age: 69
DRG: 570 | End: 2019-08-23
Attending: PODIATRIST
Payer: MEDICARE

## 2019-08-23 ENCOUNTER — ANESTHESIA (OUTPATIENT)
Dept: SURGERY | Facility: HOSPITAL | Age: 69
DRG: 570 | End: 2019-08-23
Payer: MEDICARE

## 2019-08-23 LAB
ANION GAP SERPL CALC-SCNC: 7 MMOL/L (ref 0–18)
BUN BLD-MCNC: 26 MG/DL (ref 7–18)
BUN/CREAT SERPL: 24.3 (ref 10–20)
CALCIUM BLD-MCNC: 7.9 MG/DL (ref 8.5–10.1)
CHLORIDE SERPL-SCNC: 109 MMOL/L (ref 98–112)
CO2 SERPL-SCNC: 28 MMOL/L (ref 21–32)
CREAT BLD-MCNC: 1.07 MG/DL (ref 0.7–1.3)
GLUCOSE BLD-MCNC: 83 MG/DL (ref 70–99)
GLUCOSE BLDC GLUCOMTR-MCNC: 111 MG/DL (ref 70–99)
GLUCOSE BLDC GLUCOMTR-MCNC: 152 MG/DL (ref 70–99)
GLUCOSE BLDC GLUCOMTR-MCNC: 93 MG/DL (ref 70–99)
GLUCOSE BLDC GLUCOMTR-MCNC: 94 MG/DL (ref 70–99)
OSMOLALITY SERPL CALC.SUM OF ELEC: 302 MOSM/KG (ref 275–295)
POTASSIUM SERPL-SCNC: 4.6 MMOL/L (ref 3.5–5.1)
SODIUM SERPL-SCNC: 144 MMOL/L (ref 136–145)
VANCOMYCIN TROUGH SERPL-MCNC: 19.9 UG/ML (ref 10–20)

## 2019-08-23 PROCEDURE — 0H9MXZZ DRAINAGE OF RIGHT FOOT SKIN, EXTERNAL APPROACH: ICD-10-PCS | Performed by: PODIATRIST

## 2019-08-23 PROCEDURE — 02HV33Z INSERTION OF INFUSION DEVICE INTO SUPERIOR VENA CAVA, PERCUTANEOUS APPROACH: ICD-10-PCS | Performed by: INTERNAL MEDICINE

## 2019-08-23 PROCEDURE — 11043 DBRDMT MUSC&/FSCA 1ST 20/<: CPT | Performed by: PODIATRIST

## 2019-08-23 PROCEDURE — 0JBQ0ZZ EXCISION OF RIGHT FOOT SUBCUTANEOUS TISSUE AND FASCIA, OPEN APPROACH: ICD-10-PCS | Performed by: PODIATRIST

## 2019-08-23 RX ORDER — SODIUM CHLORIDE 9 MG/ML
INJECTION, SOLUTION INTRAVENOUS CONTINUOUS PRN
Status: DISCONTINUED | OUTPATIENT
Start: 2019-08-23 | End: 2019-08-23 | Stop reason: SURG

## 2019-08-23 RX ORDER — LIDOCAINE HYDROCHLORIDE 10 MG/ML
INJECTION, SOLUTION EPIDURAL; INFILTRATION; INTRACAUDAL; PERINEURAL AS NEEDED
Status: DISCONTINUED | OUTPATIENT
Start: 2019-08-23 | End: 2019-08-23 | Stop reason: SURG

## 2019-08-23 RX ORDER — BUPIVACAINE HYDROCHLORIDE 5 MG/ML
INJECTION, SOLUTION EPIDURAL; INTRACAUDAL AS NEEDED
Status: DISCONTINUED | OUTPATIENT
Start: 2019-08-23 | End: 2019-08-23 | Stop reason: HOSPADM

## 2019-08-23 RX ORDER — SODIUM CHLORIDE 0.9 % (FLUSH) 0.9 %
10 SYRINGE (ML) INJECTION AS NEEDED
Status: DISCONTINUED | OUTPATIENT
Start: 2019-08-23 | End: 2019-08-25

## 2019-08-23 RX ORDER — ONDANSETRON 2 MG/ML
4 INJECTION INTRAMUSCULAR; INTRAVENOUS ONCE AS NEEDED
Status: DISCONTINUED | OUTPATIENT
Start: 2019-08-23 | End: 2019-08-23 | Stop reason: HOSPADM

## 2019-08-23 RX ORDER — METRONIDAZOLE 500 MG/1
500 TABLET ORAL EVERY 8 HOURS SCHEDULED
Status: DISCONTINUED | OUTPATIENT
Start: 2019-08-23 | End: 2019-08-25

## 2019-08-23 RX ORDER — PROCHLORPERAZINE EDISYLATE 5 MG/ML
5 INJECTION INTRAMUSCULAR; INTRAVENOUS ONCE AS NEEDED
Status: DISCONTINUED | OUTPATIENT
Start: 2019-08-23 | End: 2019-08-23 | Stop reason: HOSPADM

## 2019-08-23 RX ORDER — LIDOCAINE HYDROCHLORIDE 10 MG/ML
0.5 INJECTION, SOLUTION INFILTRATION; PERINEURAL ONCE AS NEEDED
Status: ACTIVE | OUTPATIENT
Start: 2019-08-23 | End: 2019-08-23

## 2019-08-23 RX ORDER — HALOPERIDOL 5 MG/ML
0.25 INJECTION INTRAMUSCULAR ONCE AS NEEDED
Status: DISCONTINUED | OUTPATIENT
Start: 2019-08-23 | End: 2019-08-23 | Stop reason: HOSPADM

## 2019-08-23 RX ORDER — NALOXONE HYDROCHLORIDE 0.4 MG/ML
80 INJECTION, SOLUTION INTRAMUSCULAR; INTRAVENOUS; SUBCUTANEOUS AS NEEDED
Status: DISCONTINUED | OUTPATIENT
Start: 2019-08-23 | End: 2019-08-23 | Stop reason: HOSPADM

## 2019-08-23 RX ORDER — EPHEDRINE SULFATE 50 MG/ML
INJECTION, SOLUTION INTRAVENOUS AS NEEDED
Status: DISCONTINUED | OUTPATIENT
Start: 2019-08-23 | End: 2019-08-23 | Stop reason: SURG

## 2019-08-23 RX ORDER — SODIUM CHLORIDE 9 MG/ML
INJECTION, SOLUTION INTRAVENOUS ONCE
Status: COMPLETED | OUTPATIENT
Start: 2019-08-23 | End: 2019-08-23

## 2019-08-23 RX ORDER — BACITRACIN 50000 [USP'U]/1
INJECTION, POWDER, LYOPHILIZED, FOR SOLUTION INTRAMUSCULAR AS NEEDED
Status: DISCONTINUED | OUTPATIENT
Start: 2019-08-23 | End: 2019-08-23 | Stop reason: HOSPADM

## 2019-08-23 RX ORDER — DEXAMETHASONE SODIUM PHOSPHATE 4 MG/ML
VIAL (ML) INJECTION AS NEEDED
Status: DISCONTINUED | OUTPATIENT
Start: 2019-08-23 | End: 2019-08-23 | Stop reason: SURG

## 2019-08-23 RX ORDER — ONDANSETRON 2 MG/ML
INJECTION INTRAMUSCULAR; INTRAVENOUS AS NEEDED
Status: DISCONTINUED | OUTPATIENT
Start: 2019-08-23 | End: 2019-08-23 | Stop reason: SURG

## 2019-08-23 RX ADMIN — DEXAMETHASONE SODIUM PHOSPHATE 4 MG: 4 MG/ML VIAL (ML) INJECTION at 08:05:00

## 2019-08-23 RX ADMIN — LIDOCAINE HYDROCHLORIDE 25 MG: 10 INJECTION, SOLUTION EPIDURAL; INFILTRATION; INTRACAUDAL; PERINEURAL at 07:44:00

## 2019-08-23 RX ADMIN — EPHEDRINE SULFATE 5 MG: 50 INJECTION, SOLUTION INTRAVENOUS at 07:54:00

## 2019-08-23 RX ADMIN — SODIUM CHLORIDE: 9 INJECTION, SOLUTION INTRAVENOUS at 08:36:00

## 2019-08-23 RX ADMIN — EPHEDRINE SULFATE 10 MG: 50 INJECTION, SOLUTION INTRAVENOUS at 07:58:00

## 2019-08-23 RX ADMIN — SODIUM CHLORIDE: 9 INJECTION, SOLUTION INTRAVENOUS at 07:36:00

## 2019-08-23 RX ADMIN — ONDANSETRON 4 MG: 2 INJECTION INTRAMUSCULAR; INTRAVENOUS at 08:05:00

## 2019-08-23 RX ADMIN — EPHEDRINE SULFATE 5 MG: 50 INJECTION, SOLUTION INTRAVENOUS at 07:57:00

## 2019-08-23 NOTE — PROGRESS NOTES
[address] [facility] Patient Status:  Inpatient    1/15/1950 MRN [id number]   Location [facility] 5SW/SE Attending Telly Samuel MD   [facility] Day # 5 PCP Izabel Tam MD, MD     Note entered on 2019.   Pa right lower extremity. Continue supportive care. Integ · *  Skin ulcers - Chronic.  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 Check skin biopsy  report for evidence of rheumatoid cutaneous vasculitis.    Resp ·     GI ·     Urinary ·  *  UTI - Continue I

## 2019-08-23 NOTE — PROGRESS NOTES
Hematoma  A hematoma is caused by an injury with damage to small blood vessels. This causes blood to leak into the tissues. Blood forms a pocket under the skin that swells and looks like a purplish patch.  Gradually the blood in the hematoma is absorbed back into the body. The swelling and pain of the hematoma will go away. This takes from one to four weeks, depending on the size of the hematoma. The skin over the hematoma may turn bluish then brown and yellow as the blood is dissolved and absorbed.  Home Care:  · Limit motion of the joints near the hematoma. If the hematoma is large and painful, you should avoid sports and other vigorous physical activity until the swelling and pain goes away.  · Apply an ice pack (ice cubes in a plastic bag, wrapped in a towel) over the injured area for 20 minutes every 1-2 hours the first day. You should continue with ice packs 3-4 times a day for the next two days. Continue the use of ice packs for relief of pain and swelling as needed.  · You may use acetaminophen (Tylenol) or ibuprofen (Motrin, Advil) to control pain, unless another pain medicine was prescribed. [ NOTE : If you have chronic liver or kidney disease or ever had a stomach ulcer or GI bleeding, talk with your doctor before using these medicines.]  Follow Up  with your doctor or as advised by our staff.  [ NOTE: A radiologist will review any X-rays that were taken. We will notify you of any new findings that may affect your care.]  Get Prompt Medical Attention  if any of the following occur:  · Redness around the hematoma  · Increase in pain or warmth in the hematoma  · Increase in size of the hematoma  · Fever of 100.4ºF (38ºC) or higher, or as directed by your healthcare provider  · If the hematoma is on the arm or leg, watch for:  ¨ Increased swelling or pain in the extremity  ¨ Numbness or tingling or blue color of the hand or foot  © 4645-3584 [employer]. [address] [place] [address], [facility]  Vascular Access Note  Inserted by Jeane Flores RN    Vascular Access Screening:   Allergies to Lidocaine: no  Allergies to Latex: no  Presence of Pacemaker/Defibrillator: No  Mastectomy with Lymph Node Dissection: No  AV Fistula / AV Graft: No  [place] [address]. All rights reserved. This information is not intended as a substitute for professional medical care. Always follow your healthcare professional's instructions.

## 2019-08-23 NOTE — ANESTHESIA PROCEDURE NOTES
Airway  Date/Time: 8/23/2019 7:44 AM  Urgency: elective    Airway not difficult    General Information and Staff    Patient location during procedure: OR  Anesthesiologist: Neeru Freire MD  Resident/CRNA: Colette Elaine CRNA  Performed: CRNA     Ind

## 2019-08-23 NOTE — ANESTHESIA PREPROCEDURE EVALUATION
Anesthesia PreOp Note    HPI:     Aron Beauchamp is a 81year old male who presents for preoperative consultation requested by: Maureen Valentine DPM    Date of Surgery: 8/18/2019 - 8/23/2019    Procedure(s):  EXTREMITY LOWER INCISION & DRAINAGE  Indicat Cholecalciferol (VITAMIN D) 1000 units Oral Tab Take 6,000 Units by mouth daily. Disp:  Rfl:  8/18/2019 at Unknown time   Calcium-Magnesium-Zinc 500-250-12.5 MG Oral Tab Take 3 tablets by mouth daily.  Disp:  Rfl:  8/18/2019 at Unknown time   Glucosamine Leroy Social Needs      Financial resource strain: Not on file      Food insecurity:        Worry: Not on file        Inability: Not on file      Transportation needs:        Medical: Not on file        Non-medical: Not on file    Tobacco Use      Smoking stat height is 1.778 m (5' 10\") and weight is 57.9 kg (127 lb 11.2 oz). His temperature is 97.6 °F (36.4 °C). His blood pressure is 93/56 and his pulse is 63. His respiration is 16 and oxygen saturation is 96%.     08/22/19  2225 08/22/19  2321 08/23/19  8931

## 2019-08-23 NOTE — BRIEF OP NOTE
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

## 2019-08-23 NOTE — PROGRESS NOTES
[facility]    Progress Note      Sg Begumson Patient Status:  Inpatient    1/15/1950 MRN [id number]   Location [facility] 3W/SW Attending Adore Sanchez MD   [facility] Day # 5 PCP Ananya Hernandez MD, MD       Subjective:   Sg Quiñones nondistended. Neurologic: No focal neurological deficits. Musculoskeletal: A lot of deformities in all extremities due to rheumatoid arthritis. Right foot in surgical dressing  Integument: No lesions. No rash.    Psychiatric: Appropriate mood and affec rheumatoid arthritis, COPD. Ceftriaxone 4--->stop  Cefepime/flagyl 0  Vancomycin 2  Right foot abscess and likely osteomyelitis. Cultures with MRSA, providentia,PSA and anaerobes. Patient has several screws after fusion. He is refusing AKA.   S/p biopsy

## 2019-08-23 NOTE — PROGRESS NOTES
Progress note    Stable cv status.     Current Medications:    Current Facility-Administered Medications:  fentaNYL citrate (SUBLIMAZE) 0.05 MG/ML injection 25 mcg 25 mcg Intravenous Q5 Min PRN   fentaNYL citrate (SUBLIMAZE) 0.05 MG/ML injection 50 mcg 50 m Blood pressure 104/74, pulse 51, temperature 97.8 °F (36.6 °C), resp. rate 15, height 177.8 cm (5' 10\"), weight 127 lb 11.2 oz (57.9 kg), SpO2 99 %.        General appearance: alert, appears stated age and cooperative  Head: Normocephalic, without obviou asymptomatic, not on any BB or CCB       8/20/19 no episodes of tachycardia, patient asymptomatic, Cam patch as outpatient. 8/21 possible surgery planned. Cardiac wise stable. 8/21 I&D planned. DC planning in progress.     8/23 patient doing well, VS

## 2019-08-23 NOTE — ANESTHESIA POSTPROCEDURE EVALUATION
Patient: Desirae Crane    Procedure Summary     Date:  08/23/19 Room / Location:  [address] [place] [address] MAIN OR 04 / 300 [facility] MAIN OR    Anesthesia Start:  4309 Anesthesia Stop:  6780    Procedure:  EXTREMITY LOWER INCISION & DRAINAGE (Right Foot) Diagnosis:  (abscess right elvira

## 2019-08-23 NOTE — CONSULTS
[facility]    Report of Consultation    Edna Saliva Patient Status:  Inpatient    1/15/1950 MRN [id number]   Location [facility] Attending Malik Gonsalez MD   [facility] Day # 5 PCP Glenn Guzmán MD, MD     Date of Ad Units, 5,000 Units, Subcutaneous, 2 times per day  •  [MAR Hold] collagenase (SANTYL) 250 UNIT/GM ointment, , Topical, Daily    Review of Systems:  A comprehensive review of systems was negative.     Physical Exam:  Blood pressure 103/59, pulse 66, temperat HERNIA  CHRONIC  WILL REPAIR ONCE RECOVERED FROM PRESENT ILLNESSES AND ONCE INFECTIONS ARE COMPLETELY CONTROLLED AND ERADICATED  PT PRESENTLY TO HAVE PODIATRIC SURGERY AND PT AWARE WE ARE LIKELY MONTHS FORM POSSIBLE HERNIA SURGERY    THANK YOU    Bren molina

## 2019-08-23 NOTE — CM/SW NOTE
Per RN, anticipated discharge over the weekend. SW updated Alisha from [employer]. Clinical updates sent. PCS form completed for ambulance (complete, 3LO2)- copies placed on pt's chart for [facility] and medical records.      [address] [place] [address] [phone]

## 2019-08-23 NOTE — OPERATIVE REPORT
[facility]    PATIENT'S NAME: Dave Vernon   ATTENDING PHYSICIAN: Denzel Alberts. Bryan Loving MD   OPERATING PHYSICIAN: Anusha George DPM   PATIENT ACCOUNT#:   [de-identified]    LOCATION:  [address] [place] [address] #:   [id number]       DATE OF BIRTH progressing proximal and even onto the posterior lateral ankle.   The most active portion of it seemed to be distal and lateral, and therefore, a portion of that skin and ulceration was resected with 2 semi-elliptical incisions in order to send it to Pathol There were no metallic implants easily visible within the incision site. The incision measured about 2 cm long. The area was then flushed with 3 L of saline containing 150,000 units of bacitracin.   Approximately half of that was used with a pulse lavage FEVER

## 2019-08-24 LAB
ANION GAP SERPL CALC-SCNC: 7 MMOL/L (ref 0–18)
BASOPHILS # BLD AUTO: 0.01 X10(3) UL (ref 0–0.2)
BASOPHILS NFR BLD AUTO: 0.2 %
BUN BLD-MCNC: 33 MG/DL (ref 7–18)
BUN/CREAT SERPL: 28.7 (ref 10–20)
CALCIUM BLD-MCNC: 8.3 MG/DL (ref 8.5–10.1)
CHLORIDE SERPL-SCNC: 112 MMOL/L (ref 98–112)
CO2 SERPL-SCNC: 27 MMOL/L (ref 21–32)
CREAT BLD-MCNC: 1.15 MG/DL (ref 0.7–1.3)
DEPRECATED RDW RBC AUTO: 77.3 FL (ref 35.1–46.3)
EOSINOPHIL # BLD AUTO: 0.06 X10(3) UL (ref 0–0.7)
EOSINOPHIL NFR BLD AUTO: 0.9 %
ERYTHROCYTE [DISTWIDTH] IN BLOOD BY AUTOMATED COUNT: 21.1 % (ref 11–15)
GLUCOSE BLD-MCNC: 76 MG/DL (ref 70–99)
GLUCOSE BLDC GLUCOMTR-MCNC: 123 MG/DL (ref 70–99)
GLUCOSE BLDC GLUCOMTR-MCNC: 75 MG/DL (ref 70–99)
GLUCOSE BLDC GLUCOMTR-MCNC: 75 MG/DL (ref 70–99)
GLUCOSE BLDC GLUCOMTR-MCNC: 97 MG/DL (ref 70–99)
HCT VFR BLD AUTO: 25.8 % (ref 39–53)
HGB BLD-MCNC: 8 G/DL (ref 13–17.5)
IMM GRANULOCYTES # BLD AUTO: 0.04 X10(3) UL (ref 0–1)
IMM GRANULOCYTES NFR BLD: 0.6 %
LYMPHOCYTES # BLD AUTO: 0.94 X10(3) UL (ref 1–4)
LYMPHOCYTES NFR BLD AUTO: 14.1 %
MCH RBC QN AUTO: 31.3 PG (ref 26–34)
MCHC RBC AUTO-ENTMCNC: 31 G/DL (ref 31–37)
MCV RBC AUTO: 100.8 FL (ref 80–100)
MONOCYTES # BLD AUTO: 0.56 X10(3) UL (ref 0.1–1)
MONOCYTES NFR BLD AUTO: 8.4 %
NEUTROPHILS # BLD AUTO: 5.04 X10 (3) UL (ref 1.5–7.7)
NEUTROPHILS # BLD AUTO: 5.04 X10(3) UL (ref 1.5–7.7)
NEUTROPHILS NFR BLD AUTO: 75.8 %
OSMOLALITY SERPL CALC.SUM OF ELEC: 308 MOSM/KG (ref 275–295)
PLATELET # BLD AUTO: 154 10(3)UL (ref 150–450)
POTASSIUM SERPL-SCNC: 4.5 MMOL/L (ref 3.5–5.1)
RBC # BLD AUTO: 2.56 X10(6)UL (ref 3.8–5.8)
SODIUM SERPL-SCNC: 146 MMOL/L (ref 136–145)
WBC # BLD AUTO: 6.7 X10(3) UL (ref 4–11)

## 2019-08-24 PROCEDURE — 99231 SBSQ HOSP IP/OBS SF/LOW 25: CPT | Performed by: PODIATRIST

## 2019-08-24 NOTE — PROGRESS NOTES
[facility] dosing service    Follow-up Pharmacokinetic Consult for Vancomycin Dosing     Marysol Wayne is a 81year old male who is being treated for osteomyelitis. Patient is on day 3 of Vancomycin and add is currently receiving 1 gm IV Q 12 hours. available)     Cefazolin  Resistant      Clindamycin >=8 Resistant      Erythromycin >=8 Resistant      Gentamicin <=0.5 Sensitive      Levofloxacin >=8 Resistant      Oxacillin >=4 Resistant      Tetracycline <=1 Sensitive      Trimethoprim/Sulfa <=10 Taya Bene

## 2019-08-24 NOTE — PROGRESS NOTES
212 [facility] Patient Status:  Inpatient    1/15/1950 MRN [id number]   Location [facility] 5SW/SE Attending Elizabeth Dowling MD   [facility] Day # 6 PCP Gian Fox MD, MD     Note entered on 2019 .   P Resp ·     GI ·     Urinary ·  *  UTI - Continue IV cefepime.    Genital ·     CV · Bradycardia - Probably at least partly from hypothyroidism.  Asymptomatic, with PACs.  Resolved  following initiation of thyroid hormone replacement therapy.    Lymph ·

## 2019-08-24 NOTE — PROGRESS NOTES
Progress note    Stable cv status.     Current Medications:    Current Facility-Administered Medications:  Normal Saline Flush 0.9 % injection 10 mL 10 mL Intravenous PRN   Cefepime HCl (MAXIPIME) 1 g in sodium chloride 0.9% 100 mL MBP/add-vantage 1 g Intra rhythm  Abdominal: soft, non-tender; bowel sounds normal; no masses,  no organomegaly  Extremities: edema 1+ and extremities normal, atraumatic, no cyanosis or edema    Results:     Laboratory Data:  Lab Results   Component Value Date    WBC 6.7 08/24/2019

## 2019-08-24 NOTE — PROGRESS NOTES
[facility]    Progress Note  Antibiotics day 6, cefepime and Flagyl 1, vancomycin 3    Laureen Misael Patient Status:  Inpatient    1/15/1950 MRN [id number]   Location [facility] 5SW/SE Attending Eddie Alvarenga MD   [facility] Day # 6 PC Right foot in postoperative dressing. There is mild warmth above the dressing. Integument: No lesions. No rash. Psychiatric: Appropriate mood and affect.      Laboratory Data:  Recent Labs   Lab 08/19/19  0611 08/20/19  1142   RBC 2.76* 2.75*   HGB 8.6* suppression. I suggest that we continue vancomycin per pharmacy dosing, cefepime (could be used 2 g IV every 12) and metronidazole (500 mg p.o. 3 times a day) for 4 weeks after debridement.    Continue current combination of antibiotics through [facility]

## 2019-08-24 NOTE — PROGRESS NOTES
[facility]    Progress Note    Kayla Tavarez Patient Status:  Inpatient    1/15/1950 MRN [id number]   Location [facility] 5SW/SE Attending Haydee Libman, MD   [address] Day # 6 PCP Dominguez Sweet MD, MD     History:  Past Medical History better. Hospital course to date: Patient had I&D of superficial abscess yesterday biopsy of skin ulcer both pertaining to the right foot    Current complaints: She has no complaints of pain, fever or chills.     Objective:   08/24/19  0944   BP: (!) 84/4 8/21/2019 at 17:45             Laboratory Data:  Recent Labs   Lab 08/24/19  0645   RBC 2.56*   HGB 8.0*   HCT 25.8*   .8*   MCH 31.3   MCHC 31.0   RDW 21.1*   NEPRELIM 5.04   WBC 6.7   .0       Assessment:  Patient Active Problem List:     H

## 2019-08-25 VITALS
WEIGHT: 127.69 LBS | BODY MASS INDEX: 18.28 KG/M2 | DIASTOLIC BLOOD PRESSURE: 60 MMHG | OXYGEN SATURATION: 96 % | HEART RATE: 72 BPM | HEIGHT: 70 IN | TEMPERATURE: 98 F | RESPIRATION RATE: 18 BRPM | SYSTOLIC BLOOD PRESSURE: 108 MMHG

## 2019-08-25 LAB
GLUCOSE BLDC GLUCOMTR-MCNC: 62 MG/DL (ref 70–99)
GLUCOSE BLDC GLUCOMTR-MCNC: 75 MG/DL (ref 70–99)
GLUCOSE BLDC GLUCOMTR-MCNC: 78 MG/DL (ref 70–99)
VANCOMYCIN TROUGH SERPL-MCNC: 25.5 UG/ML (ref 10–20)

## 2019-08-25 PROCEDURE — 99231 SBSQ HOSP IP/OBS SF/LOW 25: CPT | Performed by: PODIATRIST

## 2019-08-25 RX ORDER — LEVOTHYROXINE SODIUM 0.05 MG/1
50 TABLET ORAL
Refills: 0 | Status: ON HOLD | COMMUNITY
Start: 2019-08-26 | End: 2020-08-22

## 2019-08-25 RX ORDER — METRONIDAZOLE 500 MG/1
500 TABLET ORAL EVERY 8 HOURS SCHEDULED
Qty: 72 TABLET | Refills: 0 | Status: SHIPPED | OUTPATIENT
Start: 2019-08-25 | End: 2019-09-18

## 2019-08-25 RX ORDER — HEPARIN SODIUM 5000 [USP'U]/ML
5000 INJECTION, SOLUTION INTRAVENOUS; SUBCUTANEOUS EVERY 12 HOURS SCHEDULED
Refills: 0 | Status: ON HOLD | COMMUNITY
Start: 2019-08-25 | End: 2019-09-26

## 2019-08-25 RX ORDER — METRONIDAZOLE 500 MG/1
500 TABLET ORAL EVERY 8 HOURS SCHEDULED
Refills: 0 | Status: SHIPPED | COMMUNITY
Start: 2019-08-25 | End: 2019-08-25

## 2019-08-25 RX ORDER — SODIUM CHLORIDE 0.9 % (FLUSH) 0.9 %
10 SYRINGE (ML) INJECTION AS NEEDED
Refills: 0 | Status: ON HOLD | COMMUNITY
Start: 2019-08-25 | End: 2019-09-26

## 2019-08-25 NOTE — PROGRESS NOTES
08/25/19  0857   BP: 98/54   Pulse:    Resp: 18   Temp: 97.4 °F (36.3 °C)   Patient was seen resting comfortably in bed no complaints. Vital signs as reviewed above shows that he is stable he is afebrile. Pathology is still pending.     Objective: Kirill Chiang

## 2019-08-25 NOTE — CM/SW NOTE
SEVERIANO informed of dc to snf today. RN has already called ambulance pickup for 3:30pm. SEVERIANO placed call to snf liaison/Alisha who confirmed the pt can admit to snf at that time.      [address] [facility] [phone]  [facility] [phone]    Berna Gowers, MSW

## 2019-08-25 NOTE — PROGRESS NOTES
120 [facility] dosing service    Follow-up Pharmacokinetic Consult for Vancomycin Dosing     Olivier Saha is a 81year old male who is being treated with Vancomycin Day 5, currently receiving 1q q12h.      He is allergic to penicillins; adhesive tape; and n Erythromycin >=8 Resistant      Gentamicin <=0.5 Sensitive      Levofloxacin >=8 Resistant      Oxacillin >=4 Resistant      Tetracycline <=1 Sensitive      Trimethoprim/Sulfa <=10 Sensitive      Vancomycin 1 Sensitive    3.  URINE CULTURE, ROUTINE     Stat

## 2019-08-25 NOTE — PROGRESS NOTES
[place] [facility]    Progress Note  Antibiotics day 7, cefepime and Flagyl 2, vancomycin 4    Chick Budd Patient Status:  Inpatient    1/15/1950 MRN [id number]   Location [facility] 5SW/SE Attending Jaswant Grewal MD   [facility] Day # 7 PC Musculoskeletal: Multiple joint deformities due to rheumatoid arthritis, right foot with dressing, no inflammatory changes or edema above the dressing. Integument: No lesions. No rash. Psychiatric: Appropriate mood and affect.      Laboratory Data:  Re antibiotics and indefinite suppression.      I suggest that we continue vancomycin per pharmacy dosing, cefepime (could be used 2 g IV every 12) and metronidazole (500 mg p.o. 3 times a day) for 4 weeks after debridement.   Continue current combination of

## 2019-08-25 NOTE — DISCHARGE SUMMARY
[facility]    Discharge Summary    Jose Cruz Alan Patient Status:  Inpatient    1/15/1950 MRN [id number]   Location [facility] 5SW/SE Attending Kelley Yepez MD   [facility] Day # 7 PCP Jasmina Cortes MD, MD     Date of Admission: 20 plantar surface right foot opened and drained, cavity cleaned out by Dr. Shea Castro. Antibiotics adjusted. Seen by dietitian because of severe protein calorie malnutrition. Recommendations carried out.   Seen by general surgery Dr. Isacc Ruvalcaba for large left taking on:  9/18/2019  Quantity:  24 Bag  Refills:  0        CONTINUE taking these medications      Instructions Prescription details   Acidophilus/Pectin Caps  Commonly known as:  PROBIOTIC      Take 1 capsule by mouth 2 (two) times daily.    Refills:  0

## 2019-09-05 ENCOUNTER — TELEPHONE (OUTPATIENT)
Dept: PODIATRY CLINIC | Facility: CLINIC | Age: 69
End: 2019-09-05

## 2019-09-05 NOTE — TELEPHONE ENCOUNTER
[facility] coordinator calling to schedule post op appt for the pt. Pt had sx on 08/23/19 and was instructed to follow up within 2 weeks. No available 30 minute appts until 09/23/19. Pls contact at [phone].

## 2019-09-06 NOTE — TELEPHONE ENCOUNTER
Called [facility] back and s/w Sheryl Padilla- she states pt is on isolation due to MRSA in the wound. She wants to know if ok for pt to be seen in office . Informed her I would CB.

## 2019-09-11 ENCOUNTER — OFFICE VISIT (OUTPATIENT)
Dept: PODIATRY CLINIC | Facility: CLINIC | Age: 69
End: 2019-09-11
Payer: MEDICARE

## 2019-09-11 DIAGNOSIS — M06.9 DEFORMITY OF FOOT DUE TO RHEUMATOID ARTHRITIS (HCC): Primary | ICD-10-CM

## 2019-09-11 DIAGNOSIS — M21.969 DEFORMITY OF FOOT DUE TO RHEUMATOID ARTHRITIS (HCC): Primary | ICD-10-CM

## 2019-09-11 PROCEDURE — 99024 POSTOP FOLLOW-UP VISIT: CPT | Performed by: PODIATRIST

## 2019-09-11 NOTE — PROGRESS NOTES
Christopher Spence is a 81year old male. Patient presents with:  Post-Op: sx 8/23/19 - incision and drainage abscess right foot - nonweightbearing. wearing padded foot protector on right foot and post op shoe on left foot. pt denies pain.  wound care being don Rfl:    Cholecalciferol (VITAMIN D) 1000 units Oral Tab Take 6,000 Units by mouth daily. Disp:  Rfl:    Calcium-Magnesium-Zinc 500-250-12.5 MG Oral Tab Take 3 tablets by mouth daily.  Disp:  Rfl:    Glucosamine Sulfate 500 MG Oral Tab Take 4 tablets by mout his ulcerations are also healing very nicely. ASSESSMENT AND PLAN:   There are no diagnoses linked to this encounter. Plan:  At this point time we will get him a PEG offloading shoe and prescription for shoes to accommodate his deformities and then proc

## 2019-09-23 ENCOUNTER — APPOINTMENT (OUTPATIENT)
Dept: GENERAL RADIOLOGY | Facility: HOSPITAL | Age: 69
DRG: 314 | End: 2019-09-23
Attending: EMERGENCY MEDICINE
Payer: MEDICARE

## 2019-09-23 ENCOUNTER — HOSPITAL ENCOUNTER (INPATIENT)
Facility: HOSPITAL | Age: 69
LOS: 3 days | Discharge: NURSING, REHAB OR LONG-TERM CARE | DRG: 314 | End: 2019-09-26
Attending: EMERGENCY MEDICINE | Admitting: INTERNAL MEDICINE
Payer: MEDICARE

## 2019-09-23 ENCOUNTER — APPOINTMENT (OUTPATIENT)
Dept: CT IMAGING | Facility: HOSPITAL | Age: 69
DRG: 314 | End: 2019-09-23
Attending: INTERNAL MEDICINE
Payer: MEDICARE

## 2019-09-23 DIAGNOSIS — I50.9 ACUTE ON CHRONIC CONGESTIVE HEART FAILURE, UNSPECIFIED HEART FAILURE TYPE (HCC): ICD-10-CM

## 2019-09-23 DIAGNOSIS — R09.02 HYPOXIA: Primary | ICD-10-CM

## 2019-09-23 DIAGNOSIS — J90 BILATERAL PLEURAL EFFUSION: ICD-10-CM

## 2019-09-23 PROCEDURE — 99223 1ST HOSP IP/OBS HIGH 75: CPT | Performed by: INTERNAL MEDICINE

## 2019-09-23 PROCEDURE — 71046 X-RAY EXAM CHEST 2 VIEWS: CPT | Performed by: EMERGENCY MEDICINE

## 2019-09-23 PROCEDURE — 71260 CT THORAX DX C+: CPT | Performed by: INTERNAL MEDICINE

## 2019-09-23 RX ORDER — HEPARIN SODIUM 5000 [USP'U]/ML
5000 INJECTION, SOLUTION INTRAVENOUS; SUBCUTANEOUS EVERY 12 HOURS SCHEDULED
Status: DISCONTINUED | OUTPATIENT
Start: 2019-09-23 | End: 2019-09-23

## 2019-09-23 RX ORDER — FUROSEMIDE 10 MG/ML
40 INJECTION INTRAMUSCULAR; INTRAVENOUS ONCE
Status: COMPLETED | OUTPATIENT
Start: 2019-09-23 | End: 2019-09-23

## 2019-09-23 RX ORDER — SODIUM CHLORIDE 0.9 % (FLUSH) 0.9 %
3 SYRINGE (ML) INJECTION AS NEEDED
Status: DISCONTINUED | OUTPATIENT
Start: 2019-09-23 | End: 2019-09-26

## 2019-09-23 RX ORDER — FUROSEMIDE 10 MG/ML
40 INJECTION INTRAMUSCULAR; INTRAVENOUS DAILY
Status: DISCONTINUED | OUTPATIENT
Start: 2019-09-23 | End: 2019-09-24

## 2019-09-23 RX ORDER — LEVOTHYROXINE SODIUM 0.05 MG/1
50 TABLET ORAL
Status: DISCONTINUED | OUTPATIENT
Start: 2019-09-23 | End: 2019-09-26

## 2019-09-23 RX ORDER — HEPARIN SODIUM 5000 [USP'U]/ML
5000 INJECTION, SOLUTION INTRAVENOUS; SUBCUTANEOUS EVERY 12 HOURS SCHEDULED
Status: DISCONTINUED | OUTPATIENT
Start: 2019-09-23 | End: 2019-09-24

## 2019-09-23 NOTE — ED INITIAL ASSESSMENT (HPI)
Pt sent her from [facility]  Extended care for pneumonia   And low pulse ox.  Pt sent here for further evalution

## 2019-09-23 NOTE — ED PROVIDER NOTES
Patient Seen in: [facility] AND [facility] Emergency Department      History   Patient presents with:  Pneumonia    Stated Complaint: hypoxemia, density in lung. from [facility].  Dr. John Schroeder to admit    HPI    82-year-old male with past medical histor Resp 21   Temp 97.5 °F (36.4 °C)   Temp src Oral   SpO2 91 %   O2 Device Nasal cannula       Current:/87   Pulse 70   Temp 97.5 °F (36.4 °C) (Oral)   Resp 24   Ht 182.9 cm (6')   Wt 64.9 kg   SpO2 94%   BMI 19.39 kg/m²         Physical Exam    Phys (*)     HGB 11.1 (*)     HCT 34.6 (*)     RDW-SD 66.2 (*)     RDW 18.2 (*)     All other components within normal limits   TROPONIN I - Normal   PTT, ACTIVATED - Normal   CBC WITH DIFFERENTIAL WITH PLATELET    Narrative:      The following orders were creat cardiology as well as rheumatology who agree with current plan and will see patient. Patient understands results and agrees with admission. He has no further questions at this time.    he has been given 40 mg of Lasix in the emergency department for diu

## 2019-09-24 ENCOUNTER — APPOINTMENT (OUTPATIENT)
Dept: ULTRASOUND IMAGING | Facility: HOSPITAL | Age: 69
DRG: 314 | End: 2019-09-24
Attending: INTERNAL MEDICINE
Payer: MEDICARE

## 2019-09-24 ENCOUNTER — APPOINTMENT (OUTPATIENT)
Dept: GENERAL RADIOLOGY | Facility: HOSPITAL | Age: 69
DRG: 314 | End: 2019-09-24
Attending: INTERNAL MEDICINE
Payer: MEDICARE

## 2019-09-24 ENCOUNTER — APPOINTMENT (OUTPATIENT)
Dept: CV DIAGNOSTICS | Facility: HOSPITAL | Age: 69
DRG: 314 | End: 2019-09-24
Attending: INTERNAL MEDICINE
Payer: MEDICARE

## 2019-09-24 PROCEDURE — 93306 TTE W/DOPPLER COMPLETE: CPT | Performed by: INTERNAL MEDICINE

## 2019-09-24 PROCEDURE — 93970 EXTREMITY STUDY: CPT | Performed by: INTERNAL MEDICINE

## 2019-09-24 PROCEDURE — 99223 1ST HOSP IP/OBS HIGH 75: CPT | Performed by: INTERNAL MEDICINE

## 2019-09-24 PROCEDURE — 99232 SBSQ HOSP IP/OBS MODERATE 35: CPT | Performed by: INTERNAL MEDICINE

## 2019-09-24 RX ORDER — FUROSEMIDE 10 MG/ML
20 INJECTION INTRAMUSCULAR; INTRAVENOUS DAILY
Status: DISCONTINUED | OUTPATIENT
Start: 2019-09-25 | End: 2019-09-25

## 2019-09-24 RX ORDER — HEPARIN SODIUM AND DEXTROSE 10000; 5 [USP'U]/100ML; G/100ML
INJECTION INTRAVENOUS CONTINUOUS
Status: DISCONTINUED | OUTPATIENT
Start: 2019-09-24 | End: 2019-09-26

## 2019-09-24 RX ORDER — HEPARIN SODIUM AND DEXTROSE 10000; 5 [USP'U]/100ML; G/100ML
18 INJECTION INTRAVENOUS ONCE
Status: COMPLETED | OUTPATIENT
Start: 2019-09-24 | End: 2019-09-24

## 2019-09-24 RX ORDER — FUROSEMIDE 10 MG/ML
20 INJECTION INTRAMUSCULAR; INTRAVENOUS ONCE
Status: DISCONTINUED | OUTPATIENT
Start: 2019-09-24 | End: 2019-09-24

## 2019-09-24 RX ORDER — HEPARIN SODIUM 1000 [USP'U]/ML
80 INJECTION, SOLUTION INTRAVENOUS; SUBCUTANEOUS ONCE
Status: COMPLETED | OUTPATIENT
Start: 2019-09-24 | End: 2019-09-24

## 2019-09-24 NOTE — CONSULTS
[facility]    PATIENT'S NAME: Arelis Petersen   ATTENDING PHYSICIAN: Mayito Farrell. Genaro Dill MD   CONSULTING PHYSICIAN: River Degroot MD   PATIENT ACCOUNT#:   [id number]    LOCATION:  [address] [place] [address] RECORD #:   [id number]       DATE OF OVIDIO the morning, he is most stiff in his low back where he has scoliosis. He has minimal pain. There can be more swelling in his wrists which waxes and wanes.   At [facility], in April 2017, rheumatoid factor was positive at 136 and CCP antibody was greater than 25 Troponin normal.  BNP very high at 5316. Sed rate is 78. PAST MEDICAL HISTORY:  He has been anemic for several years he knows. He recalls his chest x-ray showing signs of COPD back in 2004.   He has chronic venous insufficiency and stasis dermatitis of trouble urinating. No dry eyes, but he notices some dry mouth in the morning. No Raynaud's or headache.       PHYSICAL EXAMINATION:    GENERAL:  Pleasant, elderly gentleman with oxygen per nasal cannula, but appears comfortable lying in his bed carolann stewart bilateral pleural effusions with compressive atelectasis of his lower lobes, diffuse anasarca. He became fluid overloaded. He is receiving IV diuresis, and is on heparin drip. This is his acute problem.   Rheumatoid lung or heart disease isn't his acute

## 2019-09-24 NOTE — H&P
[address] Patient Status:  Inpatient    1/15/1950 MRN [id number]   Location [facility] 3W/SW Attending Haydee Libman, MD   [address] [facility] [address] Day # 1 PCP Dominguez Sweet MD, MD     Note entered multifactorial, including hypothyroidism.  Resolved following initiation of levothyroxine HRT last hospital admission.    Funct · Debility - Chronic, severe with acute worsening due to acute illness.  Resolve acute problems, plan SNF rehab. at [facility] hip replacement. L hip X-ray. · Foot abscess - Shallower than originally thought. Completed IV antibiotics 9/19/2019. Healed.      DISPOSITION   · Proxy agent - Sister Adrianne Cazares. · Rescue advance directives - Full code recorded on POLST form 8/19/2019.   ·

## 2019-09-24 NOTE — PLAN OF CARE
Problem: Patient Centered Care  Goal: Patient preferences are identified and integrated in the patient's plan of care  Description  Interventions:  - What would you like us to know as we care for you?  From home with sister and mom originally, been at [facility] and minimize respiratory effort  - Oxygen supplementation based on oxygen saturation or ABGs  - Provide Smoking Cessation handout, if applicable  - Encourage broncho-pulmonary hygiene including cough, deep breathe, Incentive Spirometry  - Assess the need f

## 2019-09-24 NOTE — CM/SW NOTE
SW received call from MD [employer] - inquiring about d/c plans for pt. Per MD - pt inquiring about different SNF placement. SW went to speak w/ pt, but pt was down for testing. SW will try again before end of the day.     SW/CM to remain available for almodovar

## 2019-09-24 NOTE — CONSULTS
[facility] - [facility]    Consult Note    Date:  9/23/2019  Date of Admission:  9/23/2019      Chief Complaint:   Marysol Wayne is a(n) 81year old male with dyspnea. HPI:   The patient is a lifetime non-smoker.   He has a history of rheumatoid arth in a hospital admission)    Review of Systems:   Review of Systems:  Vision normal. Ear nose and throat normal. Bowel normal. Bladder function normal. No depression. No thyroid disease. No rash. Muscles and joints notable for profound gait abnormality.  No demonstrated normal ejection fraction with severely dilated left atrium, mild MR, moderate TR. Alternatively, with rheumatoid arthritis there could be a component of serositis.   Would also screen for pericardial effusion with repeat echocardiogram.  Would

## 2019-09-24 NOTE — CONSULTS
[facility]    Report of Consultation    Allan Bermudez Patient Status:  Inpatient    1/15/1950 MRN [id number]   Location [facility] 3W/SW Attending Roselyn Martinez MD   [facility] Day # 1 PCP Mg Spangler MD, MD     Date of Admission:   tablet (50 mcg total) by mouth before breakfast.   B Complex Vitamins (B COMPLEX 50 OR) Take 150 mg by mouth daily. Ascorbic Acid (VITAMIN C) 1000 MG Oral Tab Take 4,000 mg by mouth daily.    Cholecalciferol (VITAMIN D) 1000 units Oral Tab Take 6,000 Unit bilaterally  Cardiovascular: S1, S2 normal, no murmur, click, rub or gallop, regular rate and rhythm  Abdominal: soft, non-tender; bowel sounds normal; no masses,  no organomegaly  Extremities: extremities normal, atraumatic, no cyanosis or edema    Result chronicity. 5. Multiple bilateral rib fracture deformities. 6. Diffuse anasarca of the chest wall soft tissues. 7. Moderate hiatal hernia. 8. Lesser incidental findings as above.     A preliminary report was issued by the [employer] [facility] teleradiology failure, unspecified heart failure type ([facility] [address] with Bilateral pleural effusion with high BNP  - lasix 40 IV daily  - echo     Bradycardia avoid BB and CCB    Thank you for allowing me to participate in the care of your patient.     Nayeli Sesay  9/24/2019

## 2019-09-24 NOTE — PROGRESS NOTES
[facility]    Progress Note      Assessment and Plan:   1. Venous thromboembolism with dyspnea– this CT scan is consistent with venous thromboembolism with significant central burden of thrombus.   The patient is comfortable at this momen .0 09/24/2019    CREATSERUM 0.78 09/24/2019    BUN 24 09/24/2019     09/24/2019    K 4.2 09/24/2019     09/24/2019    CO2 26.0 09/24/2019    GLU 76 09/24/2019    CA 8.2 09/24/2019    .6 09/24/2019    INR 1.19 09/23/2019    DDIM

## 2019-09-24 NOTE — WOUND PROGRESS NOTE
WOUND CARE NOTE    History:  Past Medical History:   Diagnosis Date   • Anemia    • Arthritis    • COPD (chronic obstructive pulmonary disease) ([facility] [address]    • Disorder of thyroid    • Multiple wounds    • Nasal congestion    • Rheumatic arteritis    • Rheuma Pt is known to wound services from previous admit. Pt is s/p I and D of the ankles on 8/23/19 with Dr. Romina Zhu. Wounds have granulated in, the right lateral has a small area that is open, early epithelialization of the left wound.  Area cleansed with saline,

## 2019-09-24 NOTE — PLAN OF CARE
Problem: Patient/Family Goals  Goal: Patient/Family Long Term Goal  Description  Patient's Long Term Goal: discharge to rehab    Interventions:  - Monitor on tele  - Monitor vitals  - Heparin gtt  - Pulm consult  - See additional Care Plan goals for spec behaviors that affect risk of falls.   - [place] fall precautions as indicated by assessment.  - Educate pt/family on patient safety including physical limitations  - Instruct pt to call for assistance with activity based on assessment  - Modify environme

## 2019-09-25 ENCOUNTER — APPOINTMENT (OUTPATIENT)
Dept: GENERAL RADIOLOGY | Facility: HOSPITAL | Age: 69
DRG: 314 | End: 2019-09-25
Attending: SPECIALIST
Payer: MEDICARE

## 2019-09-25 ENCOUNTER — APPOINTMENT (OUTPATIENT)
Dept: GENERAL RADIOLOGY | Facility: HOSPITAL | Age: 69
DRG: 314 | End: 2019-09-25
Attending: INTERNAL MEDICINE
Payer: MEDICARE

## 2019-09-25 PROCEDURE — 71046 X-RAY EXAM CHEST 2 VIEWS: CPT | Performed by: INTERNAL MEDICINE

## 2019-09-25 PROCEDURE — 99232 SBSQ HOSP IP/OBS MODERATE 35: CPT | Performed by: INTERNAL MEDICINE

## 2019-09-25 PROCEDURE — 73502 X-RAY EXAM HIP UNI 2-3 VIEWS: CPT | Performed by: INTERNAL MEDICINE

## 2019-09-25 PROCEDURE — 73130 X-RAY EXAM OF HAND: CPT | Performed by: SPECIALIST

## 2019-09-25 PROCEDURE — 99233 SBSQ HOSP IP/OBS HIGH 50: CPT | Performed by: INTERNAL MEDICINE

## 2019-09-25 NOTE — DIETARY NOTE
ADULT NUTRITION INITIAL ASSESSMENT    Pt is at high nutrition risk. Pt meets severe malnutrition criteria.       CRITERIA FOR MALNUTRITION DIAGNOSIS:  Criteria for severe malnutrition diagnosis: chronic illness related to body fat severe depletion and musc arthritis ([address] Scoliosis, and Venous insufficiency. ANTHROPOMETRICS:  HT: 182.9 cm (6')  WT: 61 kg (134 lb 9 oz) noted down 8.4#(5.8%) due to duiresisng  BMI: Body mass index is 18.25 kg/m².   BMI CLASSIFICATION: less than 19 kg/m2 - underweight  IBW: Nutrient Intake:      Monitor: adequacy of PO intake and tolerance of PO intake.   - Food and Nutrient Administration:      Monitor: need for temporary enteral nutrition  - Anthropometric Measurement:      Monitor: wt and wt change   - Nutrition Goals:

## 2019-09-25 NOTE — PLAN OF CARE
Problem: Patient/Family Goals  Goal: Patient/Family Long Term Goal  Description  Patient's Long Term Goal: discharge to rehab    Interventions:  - Monitor on tele  - Monitor vitals  - Heparin gtt  - Pulm consult  - See additional Care Plan goals for spec signs/symptoms of CO2 retention  Outcome: Progressing     Problem: SKIN/TISSUE INTEGRITY - ADULT  Goal: Incision(s), wounds(s) or drain site(s) healing without S/S of infection  Description  INTERVENTIONS:  - Assess and document risk factors for pressure u

## 2019-09-25 NOTE — PROGRESS NOTES
[facility] Patient Status:  Inpatient    1/15/1950 MRN [id number]   Location [facility] 3W/SW Attending Teofilo Ho MD   [facility] Day # 2 PCP Rand Poe MD, MD     Note entered on 2019.   Ady Bethea resume exercise gradually. Continue WBAT right lower extremity.  Continue supportive care. Integ · Skin ulcers - Chronic.  Due to venous stasis. No evidence of autoimmune cutaneous vasculitis. Nearly healed. Resp · *  Pulmonary embolism - Acute.   Wi either [facility] or [facility] for continuation of TOMAS.   · Long-term plan - Eventually home.        Armin Rodriguez MD  9/25/2019

## 2019-09-25 NOTE — CM/SW NOTE
08: 40AM  SW spoke w/ pt this AM in his room and inquired about desire for alternate SNF placement upon d/c.    SW helped pt map out different SNF locations to hospital. Per pt's req: SW sent ref to 1. [facility] and 2. [facility].  Pt stated [facility] was defini

## 2019-09-25 NOTE — CONSULTS
Ortho Hand  82 y/o multiply disabled R handed male with long standing rheumatoid disease and bilat. Dislocated wrists is being evaluated for reconstructive wrist/hand surgery. No recent wrist/hand films done.   Px reveals bilat volar dislocated wrists and R

## 2019-09-25 NOTE — PHYSICAL THERAPY NOTE
PHYSICAL THERAPY EVALUATION - INPATIENT     Room Number: 447/454-W  Evaluation Date: 9/25/2019  Type of Evaluation: Initial   Physician Order: PT Eval and Treat    Presenting Problem: p/w PNA, hypoxia, acute CHF, B pleural effusion, B PEs, LE wounds  Reas deficits, decreased activity tolerance, which are below the patient's pre-admission status. The patient's Approx Degree of Impairment: 86.62% has been calculated based on documentation in the [facility] '6 clicks' Inpatient Basic Mobility Short Form.   Research Staff 24 hours     Prior Level of [place]: pt was at sub-acute rehab, assist for all ADLs. Reports he was able to ambulate 150' with rolling walker and assist.     SUBJECTIVE  \"I can't put weight on my foot without my shoe.  I'm not gonna have my sist steps with a railing?: Total     AM-PAC Score:  Raw Score: 8   Approx Degree of Impairment: 86.62%   Standardized Score (AM-PAC Scale): 28.58   CMS Modifier (G-Code): CM    FUNCTIONAL ABILITY STATUS  Gait Assessment   Gait Assistance: Not tested  Stoop/Cur

## 2019-09-25 NOTE — CDS QUERY
.Heart Failure  Daina Chan  Dear Doctor Pop Link; (To answer this query 1st click on EDIT at the top of this note, then place an    \"X\" in the brackets of all diagnosis that apply, and then click on the SIGN button at the bot Failure  ( x ) Acute on Chronic Diastolic Heart Failure   (    ) Chronic Diastolic Heart Failure    (    ) Acute Systolic Heart Failure   (    ) Acute on Chronic Systolic Heart Failure    (    ) Chronic Systolic Heart Failure             (    ) Acute combi

## 2019-09-25 NOTE — PROGRESS NOTES
[facility]    Progress Note      Assessment and Plan:   1. Venous thromboembolism with dyspnea– this CT scan is consistent with venous thromboembolism with significant central burden of thrombus.   The patient is comfortable at this momen K 4.3 09/25/2019     09/25/2019    CO2 28.0 09/25/2019    GLU 84 09/25/2019    CA 8.0 09/25/2019    PTT 65.0 09/25/2019    MG 2.4 09/25/2019    TROP <0.045 09/24/2019       Honey Brown MD  Medical Director, Critical Care, [facility]

## 2019-09-26 VITALS
OXYGEN SATURATION: 97 % | WEIGHT: 134.56 LBS | HEART RATE: 83 BPM | TEMPERATURE: 98 F | BODY MASS INDEX: 18.23 KG/M2 | DIASTOLIC BLOOD PRESSURE: 56 MMHG | SYSTOLIC BLOOD PRESSURE: 99 MMHG | RESPIRATION RATE: 16 BRPM | HEIGHT: 72 IN

## 2019-09-26 PROBLEM — I26.99 PULMONARY EMBOLISM (HCC): Status: ACTIVE | Noted: 2019-09-26

## 2019-09-26 PROBLEM — E46 MALNUTRITION (HCC): Status: ACTIVE | Noted: 2019-09-26

## 2019-09-26 PROBLEM — I82.A13 ACUTE DEEP VEIN THROMBOSIS (DVT) OF AXILLARY VEIN OF BOTH UPPER EXTREMITIES (HCC): Status: ACTIVE | Noted: 2019-09-26

## 2019-09-26 NOTE — PLAN OF CARE
Problem: Patient/Family Goals  Goal: Patient/Family Long Term Goal  Description  Patient's Long Term Goal: discharge to rehab    Interventions:  - Monitor on tele  - Monitor vitals  - Heparin gtt  - Pulm consult  - See additional Care Plan goals for spec signs/symptoms of CO2 retention  Outcome: Progressing     Problem: SKIN/TISSUE INTEGRITY - ADULT  Goal: Incision(s), wounds(s) or drain site(s) healing without S/S of infection  Description  INTERVENTIONS:  - Assess and document risk factors for pressure u Non-skid socks on.

## 2019-09-26 NOTE — PROGRESS NOTES
Report given to Arnaldo Ling RN at [facility]. Transportation scheduled to  patient at 12pm for discharge.

## 2019-09-26 NOTE — DISCHARGE SUMMARY
[facility]    Discharge Summary    Desirae Crane Patient Status:  Inpatient    1/15/1950 MRN [id number]   Location [facility] 3W/SW Attending Valery Richter MD   [address] Day # 3 PCP Deborah Shay MD, MD     Date of Admission:  pulmonary Dr. Jad Kincaid. Echo showed right ventricular strain. Exertional dyspnea and hypoxemia improved on IV heparin. IV heparin replaced by Xarelto after 2 days in the hospital.  Seen by rheumatology Jane Cabrera and Mami Langley for arthritis.   Seen by Dr. Tennille Reece these medications    collagenase 250 UNIT/GM Oint  Commonly known as:  SANTYL        Heparin Sodium (Porcine) 5000 UNIT/ML Soln        Normal Saline Flush 0.9 % Soln               Follow up Visits: Follow-up on primary medical care with Dr. Nathaniel Penn at [facility].

## 2019-09-26 NOTE — PROGRESS NOTES
Patient seen in follow up. Patient denies any chest pain or sob. Chart reviewed. Edema better.  Time spent > 35 min   09/25/19  1534   BP: 99/60   Pulse: 82   Resp: 16   Temp: 97.6 °F (36.4 °C)       Intake/Output Summary (Last 24 hours) at 9/25/2019 Sodium Chloride Flush (NORMAL SALINE FLUSH) 0.9 % Intravenous Solution Inject 10 mL into the vein as needed. Sodium Chloride Flush (NORMAL SALINE FLUSH) 0.9 % Intravenous Solution Inject 10 mL into the vein as needed.      Xr Chest Pa + Lat Chest (cpt=710 CONCLUSION:  Normal bilateral leg venous duplex exam.  No deep venous thrombosis. Complex right popliteal fossa Baker's cyst measures 6.2 x 1.6 x 3.9 cm. Complex/hemorrhagic appearing left popliteal fossa Baker's cyst measuring 9.1 x 3.7 x 65.6 cm.   Diffus Lab Results   Component Value Date    CREATSERUM 0.84 09/25/2019    CREATSERUM 0.85 09/25/2019    BUN 24 09/25/2019    BUN 24 09/25/2019     09/25/2019     09/25/2019    K 4.3 09/25/2019    K 4.3 09/25/2019     09/25/2019     09/25/

## 2019-09-26 NOTE — PROGRESS NOTES
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 Pt is not a surgical candidate for the foreseeable future considering his general medical status.   He should use his joint natasha splints and hopefully be nutrit

## 2019-09-26 NOTE — CM/SW NOTE
08: 47AM  SEVERIANO spoke w/ MD Carlson this AM and informed that [facility] unable to accept pt at this time due to no private room. MD agreeable. SEVERIANO also informed MD that she can make ref to [facility] to follow up w/ pt for after his SNF stay - MD agreeable.     SEVERIANO spok

## 2019-09-26 NOTE — PROGRESS NOTES
[facility]    Progress Note    Christopher Spence Patient Status:  Inpatient    1/15/1950 MRN [id number]   Location [facility] 3W/SW Attending Telly Borges MD   [facility] Day # 2 PCP Jesu Torres MD, MD        Subjective:   Christopher Spence completely healed with antibiotics and local care, and his right lateral ankle ulcer biopsy was negative for vasculitis. Steroids are not indicated. I would be glad to follow his rheumatoid arthritis chronically. severe damage has been done.   It is felt 08/19/2019    DDIMER >4.00 (H) 09/23/2019    ESRML 78 (H) 09/23/2019    CRP 1.68 (H) 08/18/2019    MG 2.4 09/25/2019    TROP <0.045 09/24/2019    B12 >2,000 (H) 08/20/2019       Xr Chest Pa + Lat Chest (cpt=71046)    Result Date: 9/25/2019  CONCLUSION:   P appearing left popliteal fossa Baker's cyst measuring 9.1 x 3.7 x 65.6 cm.   Diffuse heterogeneous internal echoes suggest recent hemorrhage into this Baker's cyst.  Recommend follow-up to interval resolution     Dictated by (CST): Germain Whittaker MD on

## 2019-09-26 NOTE — CONSULTS
[facility]    PATIENT'S NAME: Yosvany Kassi   ATTENDING PHYSICIAN: Michael Hill.  Bossman Crane MD   CONSULTING PHYSICIAN: Elex Rubinstein, MD   PATIENT ACCOUNT#:   [id number]    LOCATION:  [address] [place] [address] #:   [id number]       DATE OF BIRTH at home intermittently and is also taking multiple supplements. ALLERGIES:  Penicillin. FAMILY HISTORY:  His mother is alive at age 80 with Alzheimer disease, and she lives with him in a home with 5 outside stairs.   His father  at age 80 of cor short of breath at rest, although his O2 saturation is 95%. He is expected to be transferred to a rehab facility in the next few days.   I counseled the patient that I would like to obtain x-rays of his bilateral hands and wrists, and attempt to obtain inc 18:24:11  [facility] [id number]  AVELINA/    cc: Michael Hill.  Bossman Crane MD

## 2020-06-04 ENCOUNTER — LAB REQUISITION (OUTPATIENT)
Dept: LAB | Facility: HOSPITAL | Age: 70
End: 2020-06-04
Payer: MEDICARE

## 2020-06-04 DIAGNOSIS — I50.9 HEART FAILURE, UNSPECIFIED (HCC): ICD-10-CM

## 2020-06-04 PROCEDURE — 85025 COMPLETE CBC W/AUTO DIFF WBC: CPT | Performed by: INTERNAL MEDICINE

## 2020-06-04 PROCEDURE — 86140 C-REACTIVE PROTEIN: CPT | Performed by: INTERNAL MEDICINE

## 2020-06-04 PROCEDURE — 85652 RBC SED RATE AUTOMATED: CPT | Performed by: INTERNAL MEDICINE

## 2020-06-09 ENCOUNTER — LAB REQUISITION (OUTPATIENT)
Dept: LAB | Facility: HOSPITAL | Age: 70
End: 2020-06-09
Payer: MEDICARE

## 2020-06-09 DIAGNOSIS — M86.20 SUBACUTE OSTEOMYELITIS, UNSPECIFIED SITE (HCC): ICD-10-CM

## 2020-06-09 DIAGNOSIS — L97.313 NON-PRESSURE CHRONIC ULCER OF RIGHT ANKLE WITH NECROSIS OF MUSCLE (HCC): ICD-10-CM

## 2020-06-09 DIAGNOSIS — I87.2 VENOUS INSUFFICIENCY (CHRONIC) (PERIPHERAL): ICD-10-CM

## 2020-06-09 PROCEDURE — 87186 SC STD MICRODIL/AGAR DIL: CPT

## 2020-06-09 PROCEDURE — 87205 SMEAR GRAM STAIN: CPT

## 2020-06-09 PROCEDURE — 87077 CULTURE AEROBIC IDENTIFY: CPT

## 2020-06-09 PROCEDURE — 87070 CULTURE OTHR SPECIMN AEROBIC: CPT

## 2020-06-09 PROCEDURE — 87075 CULTR BACTERIA EXCEPT BLOOD: CPT

## 2020-08-22 ENCOUNTER — HOSPITAL ENCOUNTER (INPATIENT)
Facility: HOSPITAL | Age: 70
LOS: 3 days | Discharge: HOME HEALTH | DRG: 871 | End: 2020-08-25
Admitting: INTERNAL MEDICINE
Payer: MEDICARE

## 2020-08-22 ENCOUNTER — APPOINTMENT (OUTPATIENT)
Dept: GENERAL RADIOLOGY | Facility: HOSPITAL | Age: 70
DRG: 871 | End: 2020-08-22
Payer: MEDICARE

## 2020-08-22 DIAGNOSIS — L03.115 CELLULITIS OF RIGHT LEG: Primary | ICD-10-CM

## 2020-08-22 LAB
ANION GAP SERPL CALC-SCNC: 6 MMOL/L (ref 0–18)
BASOPHILS # BLD: 0 X10(3) UL (ref 0–0.2)
BASOPHILS NFR BLD: 0 %
BUN BLD-MCNC: 30 MG/DL (ref 7–18)
BUN/CREAT SERPL: 28.6 (ref 10–20)
CALCIUM BLD-MCNC: 9.5 MG/DL (ref 8.5–10.1)
CHLORIDE SERPL-SCNC: 112 MMOL/L (ref 98–112)
CO2 SERPL-SCNC: 26 MMOL/L (ref 21–32)
CREAT BLD-MCNC: 1.05 MG/DL (ref 0.7–1.3)
DEPRECATED RDW RBC AUTO: 78.7 FL (ref 35.1–46.3)
EOSINOPHIL # BLD: 0 X10(3) UL (ref 0–0.7)
EOSINOPHIL NFR BLD: 0 %
ERYTHROCYTE [DISTWIDTH] IN BLOOD BY AUTOMATED COUNT: 22.4 % (ref 11–15)
GLUCOSE BLD-MCNC: 114 MG/DL (ref 70–99)
HCT VFR BLD AUTO: 33.5 % (ref 39–53)
HGB BLD-MCNC: 10.8 G/DL (ref 13–17.5)
LYMPHOCYTES NFR BLD: 0.15 X10(3) UL (ref 1–4)
LYMPHOCYTES NFR BLD: 1 %
MCH RBC QN AUTO: 31.2 PG (ref 26–34)
MCHC RBC AUTO-ENTMCNC: 32.2 G/DL (ref 31–37)
MCV RBC AUTO: 96.8 FL (ref 80–100)
MONOCYTES # BLD: 0.45 X10(3) UL (ref 0.1–1)
MONOCYTES NFR BLD: 3 %
NEUTROPHILS # BLD AUTO: 14.47 X10 (3) UL (ref 1.5–7.7)
NEUTROPHILS NFR BLD: 47 %
NEUTS BAND NFR BLD: 49 %
NEUTS HYPERSEG # BLD: 14.4 X10(3) UL (ref 1.5–7.7)
NEUTS VAC BLD QL SMEAR: PRESENT
OSMOLALITY SERPL CALC.SUM OF ELEC: 305 MOSM/KG (ref 275–295)
PLATELET # BLD AUTO: 121 10(3)UL (ref 150–450)
PLATELET MORPHOLOGY: NORMAL
POTASSIUM SERPL-SCNC: 4.2 MMOL/L (ref 3.5–5.1)
RBC # BLD AUTO: 3.46 X10(6)UL (ref 3.8–5.8)
SARS-COV-2 RNA RESP QL NAA+PROBE: NOT DETECTED
SODIUM SERPL-SCNC: 144 MMOL/L (ref 136–145)
TOTAL CELLS COUNTED: 100
WBC # BLD AUTO: 15 X10(3) UL (ref 4–11)

## 2020-08-22 PROCEDURE — 85025 COMPLETE CBC W/AUTO DIFF WBC: CPT | Performed by: EMERGENCY MEDICINE

## 2020-08-22 PROCEDURE — 73560 X-RAY EXAM OF KNEE 1 OR 2: CPT

## 2020-08-22 PROCEDURE — 96366 THER/PROPH/DIAG IV INF ADDON: CPT

## 2020-08-22 PROCEDURE — 99285 EMERGENCY DEPT VISIT HI MDM: CPT

## 2020-08-22 PROCEDURE — 96365 THER/PROPH/DIAG IV INF INIT: CPT

## 2020-08-22 PROCEDURE — 85007 BL SMEAR W/DIFF WBC COUNT: CPT | Performed by: EMERGENCY MEDICINE

## 2020-08-22 PROCEDURE — 85027 COMPLETE CBC AUTOMATED: CPT | Performed by: EMERGENCY MEDICINE

## 2020-08-22 PROCEDURE — 80048 BASIC METABOLIC PNL TOTAL CA: CPT | Performed by: EMERGENCY MEDICINE

## 2020-08-22 RX ORDER — METHYLPREDNISOLONE 4 MG
4 TABLET, DOSE PACK ORAL DAILY
Status: DISCONTINUED | OUTPATIENT
Start: 2020-08-22 | End: 2020-08-22

## 2020-08-22 RX ORDER — ASCORBIC ACID 500 MG
4000 TABLET ORAL DAILY
Status: DISCONTINUED | OUTPATIENT
Start: 2020-08-22 | End: 2020-08-22

## 2020-08-22 RX ORDER — FOLIC ACID/VIT B COMPLEX AND C 400 MCG
1 TABLET ORAL DAILY
Status: DISCONTINUED | OUTPATIENT
Start: 2020-08-22 | End: 2020-08-25

## 2020-08-22 RX ORDER — GARLIC EXTRACT 500 MG
1 CAPSULE ORAL 2 TIMES DAILY
Status: DISCONTINUED | OUTPATIENT
Start: 2020-08-22 | End: 2020-08-25

## 2020-08-22 NOTE — PLAN OF CARE
Problem: Patient Centered Care  Goal: Patient preferences are identified and integrated in the patient's plan of care  Description  Interventions:  - What would you like us to know as we care for you?  Pt lives at home with his sister  - Provide timely, c neutropenic period  Description  INTERVENTIONS  - Monitor WBC  - Administer growth factors as ordered  - Implement neutropenic guidelines  Outcome: Progressing     Problem: SAFETY ADULT - FALL  Goal: Free from fall injury  Description  INTERVENTIONS:  - As ulcers present. Wounds clean and dressed. Pt tolerated well. Pt in bed call light within  reach.  Will continue to monitor

## 2020-08-22 NOTE — ED PROVIDER NOTES
Patient Seen in: [facility] Emergency Department      History   Patient presents with:  Knee Pain    Stated Complaint: right knee pain    HPI    Patient is a 73-year-old male with a history of rheumatoid arthritis presently not on medicine who ove well-developed and well-nourished. HEENT:   Head: Normocephalic and atraumatic.    Right Ear: External ear normal.   Left Ear: External ear normal.   Nose: Nose normal.   Mouth/Throat: Oropharynx is clear and moist.   Eyes: Conjunctivae and EOM are normal RDW-SD 78.7 (*)     RDW 22.4 (*)     .0 (*)     Neutrophil Absolute Prelim 14.47 (*)     All other components within normal limits   CBC WITH DIFFERENTIAL WITH PLATELET    Narrative:      The following orders were created for panel order CBC WITH DIF

## 2020-08-22 NOTE — ED NOTES
Orders for admission, patient is aware of plan and ready to go upstairs. Any questions, please call [place] [facility]. Patient has right knee/leg cellulitis. Started on IV abt.

## 2020-08-23 LAB
ANION GAP SERPL CALC-SCNC: 5 MMOL/L (ref 0–18)
BASOPHILS # BLD: 0 X10(3) UL (ref 0–0.2)
BASOPHILS NFR BLD: 0 %
BUN BLD-MCNC: 31 MG/DL (ref 7–18)
BUN/CREAT SERPL: 31 (ref 10–20)
CALCIUM BLD-MCNC: 8.8 MG/DL (ref 8.5–10.1)
CHLORIDE SERPL-SCNC: 114 MMOL/L (ref 98–112)
CO2 SERPL-SCNC: 27 MMOL/L (ref 21–32)
CREAT BLD-MCNC: 1 MG/DL (ref 0.7–1.3)
DEPRECATED RDW RBC AUTO: 80.5 FL (ref 35.1–46.3)
EOSINOPHIL # BLD: 0.11 X10(3) UL (ref 0–0.7)
EOSINOPHIL NFR BLD: 1 %
ERYTHROCYTE [DISTWIDTH] IN BLOOD BY AUTOMATED COUNT: 22.5 % (ref 11–15)
GLUCOSE BLD-MCNC: 74 MG/DL (ref 70–99)
HCT VFR BLD AUTO: 28.9 % (ref 39–53)
HGB BLD-MCNC: 9.1 G/DL (ref 13–17.5)
LYMPHOCYTES NFR BLD: 0.11 X10(3) UL (ref 1–4)
LYMPHOCYTES NFR BLD: 1 %
MCH RBC QN AUTO: 30.8 PG (ref 26–34)
MCHC RBC AUTO-ENTMCNC: 31.5 G/DL (ref 31–37)
MCV RBC AUTO: 98 FL (ref 80–100)
MONOCYTES # BLD: 0.22 X10(3) UL (ref 0.1–1)
MONOCYTES NFR BLD: 2 %
MORPHOLOGY: NORMAL
NEUTROPHILS # BLD AUTO: 10.34 X10 (3) UL (ref 1.5–7.7)
NEUTROPHILS NFR BLD: 78 %
NEUTS BAND NFR BLD: 18 %
NEUTS HYPERSEG # BLD: 10.75 X10(3) UL (ref 1.5–7.7)
OSMOLALITY SERPL CALC.SUM OF ELEC: 307 MOSM/KG (ref 275–295)
PLATELET # BLD AUTO: 108 10(3)UL (ref 150–450)
PLATELET MORPHOLOGY: NORMAL
POTASSIUM SERPL-SCNC: 4.5 MMOL/L (ref 3.5–5.1)
RBC # BLD AUTO: 2.95 X10(6)UL (ref 3.8–5.8)
SODIUM SERPL-SCNC: 146 MMOL/L (ref 136–145)
TOTAL CELLS COUNTED: 100
TSI SER-ACNC: 20.1 MIU/ML (ref 0.36–3.74)
WBC # BLD AUTO: 11.2 X10(3) UL (ref 4–11)

## 2020-08-23 PROCEDURE — 84443 ASSAY THYROID STIM HORMONE: CPT | Performed by: INTERNAL MEDICINE

## 2020-08-23 PROCEDURE — 85007 BL SMEAR W/DIFF WBC COUNT: CPT | Performed by: INTERNAL MEDICINE

## 2020-08-23 PROCEDURE — 97530 THERAPEUTIC ACTIVITIES: CPT

## 2020-08-23 PROCEDURE — 80048 BASIC METABOLIC PNL TOTAL CA: CPT | Performed by: INTERNAL MEDICINE

## 2020-08-23 PROCEDURE — 85025 COMPLETE CBC W/AUTO DIFF WBC: CPT | Performed by: INTERNAL MEDICINE

## 2020-08-23 PROCEDURE — 85027 COMPLETE CBC AUTOMATED: CPT | Performed by: INTERNAL MEDICINE

## 2020-08-23 PROCEDURE — 97166 OT EVAL MOD COMPLEX 45 MIN: CPT

## 2020-08-23 RX ORDER — LEVOTHYROXINE SODIUM 0.03 MG/1
25 TABLET ORAL
Status: DISCONTINUED | OUTPATIENT
Start: 2020-08-24 | End: 2020-08-25

## 2020-08-23 RX ORDER — SODIUM CHLORIDE 9 MG/ML
INJECTION, SOLUTION INTRAVENOUS CONTINUOUS
Status: DISCONTINUED | OUTPATIENT
Start: 2020-08-23 | End: 2020-08-25

## 2020-08-23 NOTE — H&P
[address] Patient Status:  Inpatient    1/15/1950 MRN [id number]   Location [facility] 5SW/SE Attending Echo Martinez MD   Hosp Day # 0 PCP Shayna Carrillo MD, MD     Note entere oral feeding. · Hypothermia - Probably multifactorial, including hypothyroidism.  Resolved following initiation of levothyroxine HRT. · FEN - With mild dehydration. Push oral fluids. Check BMP again.    Funct · Debility - Chronic, severe with acute wors Continue living as independently as possible with his sister Iram Mcconnell in their own home.     Svitlana Villalobos MD  8/22/2020

## 2020-08-23 NOTE — CM/SW NOTE
SW received MDO for [address] \"woundcare\" services. SW reviewed pts chart and was notified by Frida Tavera at [facility] that patient is current with their services.      Pending PT/OT eval and pt's choice, pt would need [facility] orders for wound care

## 2020-08-23 NOTE — PLAN OF CARE
Pt is alert and oriented. Pt temperature and BP low, Md ordered a one time bolus over 4hrs. Warm blankets and hot packs applied. Pt put on 0.9 normal saline @ 100ml/hr continuous, Orthostatics also ordered pt was asymptomatic.  Maggots also found in pts wou distraction and/or relaxation techniques  - Monitor for opioid side effects  - Notify MD/LIP if interventions unsuccessful or patient reports new pain  - Anticipate increased pain with activity and pre-medicate as appropriate  Outcome: Progressing     Prob

## 2020-08-23 NOTE — PLAN OF CARE
Problem: Patient Centered Care  Goal: Patient preferences are identified and integrated in the patient's plan of care  Description  Interventions:  - What would you like us to know as we care for you?  Pt lives at home with his sister  - Provide timely, c neutropenic period  Description  INTERVENTIONS  - Monitor WBC  - Administer growth factors as ordered  - Implement neutropenic guidelines  Outcome: Progressing     Problem: SAFETY ADULT - FALL  Goal: Free from fall injury  Description  INTERVENTIONS:  - As

## 2020-08-23 NOTE — OCCUPATIONAL THERAPY NOTE
OCCUPATIONAL THERAPY EVALUATION - INPATIENT     Room Number: 556/556-A  Evaluation Date: 8/23/2020  Type of Evaluation: Initial  Presenting Problem: painful, swollen R knee    Physician Order: IP Consult to Occupational Therapy  Reason for Therapy: ADL/IAD deconditioning, decreased awareness of deficits, decreased functional reach . These deficits manifest functionally while performing bed mobility, functional transfers, ADLs.    The patient is below baseline and would benefit from skilled inpatient OT to ad rail  Use of Assistive Device(s): RW    Prior Level of [place]: Pt lives with his sister in a 2 story house with chair lift. He reports he is Mod I with most ADLs. He has a caregiver 2x/week to assist with bathing.  Pt has a RTS with GB, shower chair, or urinal? : A Lot  -   Putting on and taking off regular upper body clothing?: A Little  -   Taking care of personal grooming such as brushing teeth?: A Little  -   Eating meals?: A Little    AM-PAC Score:  Score: 15  Approx Degree of Impairment: 56.46%

## 2020-08-24 ENCOUNTER — APPOINTMENT (OUTPATIENT)
Dept: ULTRASOUND IMAGING | Facility: HOSPITAL | Age: 70
DRG: 871 | End: 2020-08-24
Attending: INTERNAL MEDICINE
Payer: MEDICARE

## 2020-08-24 PROCEDURE — 99213 OFFICE O/P EST LOW 20 MIN: CPT

## 2020-08-24 PROCEDURE — 97162 PT EVAL MOD COMPLEX 30 MIN: CPT

## 2020-08-24 PROCEDURE — 97530 THERAPEUTIC ACTIVITIES: CPT

## 2020-08-24 PROCEDURE — 93971 EXTREMITY STUDY: CPT | Performed by: INTERNAL MEDICINE

## 2020-08-24 NOTE — PROGRESS NOTES
[facility] Patient Status:  Inpatient    1/15/1950 MRN [id number]   Location [facility] 5SW/SE Attending Teofilo Ho MD   [facility] Day # 2 PCP Rand Poe MD, MD     Note entered on 2020.   Pa inguinal.  Repair when cellulitis resolved and wound healed right ankle.   · Inflammatory arthritis - Chronic.  Rheumatoid.  Late stage.  With persistent chronic pain left hip thought to be at least as much due to osteoarthrosis as RA;  eventually may consi

## 2020-08-24 NOTE — PLAN OF CARE
Pt is alert and oriented. Pt bp was low once tonight and then went back into the 39A systolic. Frequent vitals taken on pt, frequent monitoring and rounding, bed alarm on, q2 turn, call light in reach.  Wound care will see the pt in the am. Will continue to effects  - Notify MD/LIP if interventions unsuccessful or patient reports new pain  - Anticipate increased pain with activity and pre-medicate as appropriate  Outcome: Progressing     Problem: SAFETY ADULT - FALL  Goal: Free from fall injury  Description

## 2020-08-24 NOTE — CM/SW NOTE
BP GEENA Proposal:   Patient was enrolled under DRG  872 . BPCI/Medicare Due to iso status and need to conserve PPE, Letter provided to RN for pt review.     GEENA Proposal: Home Health Agency    Ambulatory Status: Caregiver needed    Activities of Daily Christian Torres

## 2020-08-24 NOTE — DIETARY NOTE
ADULT NUTRITION INITIAL ASSESSMENT    Pt is at high nutrition risk. Pt meets severe malnutrition criteria.       CRITERIA FOR MALNUTRITION DIAGNOSIS:  Criteria for severe malnutrition diagnosis: chronic illness related to body fat severe depletion and musc 0715-4382 calories/day (MS REE = 1442*AF(1.3)*SF(1.3-1.4) or 39-42 calories per kg Current wt)  o Protein:  grams protein/day (1.5-2 grams protein per kg Current wt)  o Fluid needs: 9686-4105 ml/day (1 ml/kcal baseline)  - Diet: Regular/General  - R 62.8 kg (138 lb 6.4 oz)     Wt Readings from Last 10 Encounters:  08/22/20 : 62.8 kg (138 lb 6.4 oz)  09/25/19 : 61 kg (134 lb 9 oz)  08/22/19 : 57.9 kg (127 lb 11.2 oz)    GASTROINTESTINAL: Denies N/V/D or constipation; denies chewing/swallowing difficult heels    MONITOR AND EVALUATE/NUTRITION GOALS:  - Food and Nutrient Intake:      Monitor: adequacy of PO intake, adequacy of supplement intake and tolerance of supplement intake.   - Anthropometric Measurement:      Monitor: wt  - Nutrition Goals:      grad

## 2020-08-24 NOTE — PLAN OF CARE
Pt Blood Pressure has been on and off in the 38S systolic the last two nights. Md has been notified when BP is low. No further orders from the Md at this time. Will continue to monitor.

## 2020-08-24 NOTE — PLAN OF CARE
Mariam Harrison is alert and oriented x4, VSS, Patient agreeable to work with PT/OT today to attempt to sit in chair. Wound care RN to see patient today. Iv abx continued. Safety precautions maintained. Call light within reach.     Problem: Patient Centered Care  Goal neutropenic period  Description  INTERVENTIONS  - Monitor WBC  - Administer growth factors as ordered  - Implement neutropenic guidelines  Outcome: Progressing     Problem: SAFETY ADULT - FALL  Goal: Free from fall injury  Description  INTERVENTIONS:  - As

## 2020-08-24 NOTE — WOUND PROGRESS NOTE
WOUND CARE NOTE    History:  Past Medical History:   Diagnosis Date   • Anemia    • Arthritis    • COPD (chronic obstructive pulmonary disease) ([facility] [address]    • Disorder of thyroid    • Multiple wounds    • Nasal congestion    • Rheumatic arteritis    • Rheuma will await Arterial doppler results to decide on compression  Per the pt. the compression applied at home is ending behind his knees, causing pain      Discharge Recommendations:    is following the pt.    He has [facility] care at home      [address] [address]  [phone]

## 2020-08-24 NOTE — PHYSICAL THERAPY NOTE
PHYSICAL THERAPY EVALUATION - INPATIENT     Room Number: 556/556-A  Evaluation Date: 8/24/2020  Type of Evaluation: Initial   Physician Order: PT Eval and Treat    Presenting Problem: RLE cellulitis  Reason for Therapy: Mobility Dysfunction and Discharge D/C. Patient ultimately not safe for home at this time, and would benefit from rehab, however patient has concerns about going to rehab and would like to return home.  The patient's Approx Degree of Impairment: 64.91% has been calculated based on documentat ADL's and ambulation with rolling walker prior to admission to the hospital. Patient has the firepeople bring him in and out of his home.      SUBJECTIVE  \"I don't want to get stuck in rehab for another 6 weeks of my life\"    PHYSICAL THERAPY EXAMINATION addressed; Alarm set    CURRENT GOALS    Goals to be met by: 9/5/20  Patient Goal Patient's self-stated goal is: to go home   Goal #1 Patient is able to demonstrate supine - sit EOB @ level: independent     Goal #1   Current Status    Goal #2 Patient is abl

## 2020-08-24 NOTE — WOUND PROGRESS NOTE
Wound Care Services  Attempting to see the pt. ,he is up in the chair, he just worked with PT, he would like to eat his lunch. I will re-see the pt. Notified the pt's nurse.

## 2020-08-25 VITALS
TEMPERATURE: 98 F | SYSTOLIC BLOOD PRESSURE: 103 MMHG | BODY MASS INDEX: 18.74 KG/M2 | WEIGHT: 138.38 LBS | HEART RATE: 69 BPM | OXYGEN SATURATION: 95 % | DIASTOLIC BLOOD PRESSURE: 55 MMHG | HEIGHT: 72 IN | RESPIRATION RATE: 16 BRPM

## 2020-08-25 PROBLEM — M71.20 BAKER'S CYST OF KNEE: Chronic | Status: ACTIVE | Noted: 2020-08-25

## 2020-08-25 RX ORDER — LEVOTHYROXINE SODIUM 0.03 MG/1
25 TABLET ORAL
Qty: 30 TABLET | Refills: 4 | Status: SHIPPED | OUTPATIENT
Start: 2020-08-26

## 2020-08-25 RX ORDER — CEPHALEXIN 500 MG/1
500 CAPSULE ORAL EVERY 8 HOURS SCHEDULED
Status: DISCONTINUED | OUTPATIENT
Start: 2020-08-25 | End: 2020-08-25

## 2020-08-25 RX ORDER — CEPHALEXIN 500 MG/1
500 CAPSULE ORAL 3 TIMES DAILY
Qty: 9 CAPSULE | Refills: 0 | Status: SHIPPED | OUTPATIENT
Start: 2020-08-25 | End: 2020-08-28

## 2020-08-25 RX ORDER — CEPHALEXIN 500 MG/1
500 CAPSULE ORAL EVERY 6 HOURS SCHEDULED
Status: DISCONTINUED | OUTPATIENT
Start: 2020-08-25 | End: 2020-08-25

## 2020-08-25 NOTE — CM/SW NOTE
JAZZMINE notified by MD that pt is dc ready. MD requested JAZZMINE meet with pt to offer TOMAS one more time. If pt declines he is set up with [facility].     JAZZMINE met with pt who is still unsure if he wants to go home with his sister and [facility] and hire [facility]

## 2020-08-25 NOTE — PLAN OF CARE
Wound dressing changed today. Redness in RLE improved. Discharge instructions given to patient at bedside. Patient verbalized understanding. RX for keflex filled at [facility] downstairs picked up and given to patient.  Levothyroxine not in stock,  to appropriate and evaluate response  - Consider cultural and social influences on pain and pain management  - Manage/alleviate anxiety  - Utilize distraction and/or relaxation techniques  - Monitor for opioid side effects  - Notify MD/LIP if interventions un Refer to Case Management Department for coordinating discharge planning if the patient needs post-hospital services based on physician/LIP order or complex needs related to functional status, cognitive ability or social support system  Outcome: Adequate fo

## 2020-08-25 NOTE — CM/SW NOTE
08/25/20 1000   Discharge disposition   Expected discharge disposition Home-Health   Name of Facillity/Home Care/Hospice Residential   Discharge transportation Private car       ROSINA Amaro, [facility]  Social Work   [id number]:#[id number]

## 2020-08-25 NOTE — CM/SW NOTE
MDO to SW to inform of pt d/c and to f/u with [facility] to resume services. SW informed [facility] @ [facility] that pt is d/c today. Pt is refusing SNF placement despite recommendation and education.       ROSINA Dobson, [facility]  Social Work   DUN:#[id number]

## 2020-08-26 NOTE — DISCHARGE SUMMARY
[facility]    Discharge Summary    Nestor Bustillos Patient Status:  Inpatient    1/15/1950 MRN [id number]   Location [facility] 5SW/SE Attending No att. providers found   [facility] Day # 3 PCP Salomon Rice MD, MD     Date of Admission: ceftriaxone and admitted inpatient for further management. Hospital Course: With history of untreated hypothyroidism, he was found to be hypothyroid on admission. Resumed levothyroxine. Continued IV fluid and IV ceftriaxone.   Dehydration and hypoten Take 1 capsule by mouth 2 (two) times daily. Refills:  0     B COMPLEX 50 OR      Take 150 mg by mouth daily. Refills:  0     Calcium-Magnesium-Zinc 500-250-12.5 MG Tabs      Take 3 tablets by mouth daily.    Refills:  0     Glucosamine Sulfate 500 MG T

## 2020-08-30 ENCOUNTER — HOSPITAL ENCOUNTER (OUTPATIENT)
Facility: HOSPITAL | Age: 70
Setting detail: OBSERVATION
Discharge: NURSING, REHAB OR LONG-TERM CARE | End: 2020-09-03
Attending: EMERGENCY MEDICINE | Admitting: INTERNAL MEDICINE
Payer: MEDICARE

## 2020-08-30 DIAGNOSIS — R53.1 WEAKNESS GENERALIZED: Primary | ICD-10-CM

## 2020-08-30 PROCEDURE — 99285 EMERGENCY DEPT VISIT HI MDM: CPT

## 2020-08-30 PROCEDURE — 36415 COLL VENOUS BLD VENIPUNCTURE: CPT

## 2020-08-31 PROCEDURE — 97530 THERAPEUTIC ACTIVITIES: CPT

## 2020-08-31 PROCEDURE — 97162 PT EVAL MOD COMPLEX 30 MIN: CPT

## 2020-08-31 PROCEDURE — 81001 URINALYSIS AUTO W/SCOPE: CPT | Performed by: INTERNAL MEDICINE

## 2020-08-31 PROCEDURE — 85652 RBC SED RATE AUTOMATED: CPT | Performed by: INTERNAL MEDICINE

## 2020-08-31 PROCEDURE — 85025 COMPLETE CBC W/AUTO DIFF WBC: CPT | Performed by: EMERGENCY MEDICINE

## 2020-08-31 PROCEDURE — 86141 C-REACTIVE PROTEIN HS: CPT | Performed by: INTERNAL MEDICINE

## 2020-08-31 PROCEDURE — 80048 BASIC METABOLIC PNL TOTAL CA: CPT | Performed by: EMERGENCY MEDICINE

## 2020-08-31 PROCEDURE — 97166 OT EVAL MOD COMPLEX 45 MIN: CPT

## 2020-08-31 PROCEDURE — 96372 THER/PROPH/DIAG INJ SC/IM: CPT

## 2020-08-31 RX ORDER — HEPARIN SODIUM 5000 [USP'U]/ML
5000 INJECTION, SOLUTION INTRAVENOUS; SUBCUTANEOUS EVERY 8 HOURS SCHEDULED
Status: DISCONTINUED | OUTPATIENT
Start: 2020-08-31 | End: 2020-09-03

## 2020-08-31 RX ORDER — ASCORBIC ACID 500 MG
2000 TABLET ORAL DAILY
Status: DISCONTINUED | OUTPATIENT
Start: 2020-08-31 | End: 2020-09-03

## 2020-08-31 RX ORDER — GARLIC EXTRACT 500 MG
1 CAPSULE ORAL 2 TIMES DAILY
Status: DISCONTINUED | OUTPATIENT
Start: 2020-08-31 | End: 2020-09-03

## 2020-08-31 RX ORDER — LEVOTHYROXINE SODIUM 0.03 MG/1
25 TABLET ORAL
Status: DISCONTINUED | OUTPATIENT
Start: 2020-08-31 | End: 2020-09-03

## 2020-08-31 RX ORDER — VITAMIN B COMPLEX
TABLET ORAL DAILY
Status: DISCONTINUED | OUTPATIENT
Start: 2020-08-31 | End: 2020-08-31

## 2020-08-31 RX ORDER — ONDANSETRON 2 MG/ML
4 INJECTION INTRAMUSCULAR; INTRAVENOUS EVERY 4 HOURS PRN
Status: DISCONTINUED | OUTPATIENT
Start: 2020-08-31 | End: 2020-09-03

## 2020-08-31 RX ORDER — METHYLPREDNISOLONE 4 MG
4 TABLET, DOSE PACK ORAL DAILY
Status: DISCONTINUED | OUTPATIENT
Start: 2020-08-31 | End: 2020-08-31

## 2020-08-31 RX ORDER — IBUPROFEN 400 MG/1
400 TABLET ORAL
Status: DISCONTINUED | OUTPATIENT
Start: 2020-08-31 | End: 2020-09-03

## 2020-08-31 RX ORDER — DEXTROSE AND SODIUM CHLORIDE 5; .9 G/100ML; G/100ML
INJECTION, SOLUTION INTRAVENOUS CONTINUOUS
Status: DISCONTINUED | OUTPATIENT
Start: 2020-08-31 | End: 2020-09-03

## 2020-08-31 NOTE — OCCUPATIONAL THERAPY NOTE
OCCUPATIONAL THERAPY EVALUATION - INPATIENT     Room Number: 859/699-S  Evaluation Date: 8/31/2020  Type of Evaluation: Initial  Presenting Problem: low back pain, weakness    Physician Order: IP Consult to Occupational Therapy  Reason for Therapy: ADL/IAD with set-up of his meal (packaging) -pt directing where to place items (preferring Left side) due to AROM limitations in UEs (right more than left).     The patient's Approx Degree of Impairment: 59.67% has been calculated based on documentation in the [facility] 2 story house with chair lift. He reports he is Mod I with most ADLs. He has a caregiver 2x/week to assist with bathing. Pt has a RTS with GB, shower chair, tub t/f chair, tub, GB. He reports he is able to make simple meals.  His sister does the grocery oswald and placement of items   Bathing: NT  Toileting: condom catheter  Upper Extremity Dressing: NT  Lower Extremity Dressing: Max A    Education Provided: role of OT, activity promotion   Patient End of Session: Up in chair;Needs met;Call light within reach;RN

## 2020-08-31 NOTE — ED INITIAL ASSESSMENT (HPI)
\"Crippling\" low back pain that began yesterday. Symptoms worse with movement or putting any attempt to ambulate. Denies saddle anesthesia or b/b incontinence.

## 2020-08-31 NOTE — ED NOTES
Patient requesting assistance with condom/Texas cath for urination. CM contacted by MD to help arrange rehab, have not heard back.

## 2020-08-31 NOTE — PROGRESS NOTES
Pharmacy Note: Dietary Supplement Discontinuation Per Policy    B Complex tablets,Glucosamine, and Ca-Mag-Zinc tablets has been discontinued on [facility] per policy.  These supplements may be restarted upon discharge using the medication reconciliation

## 2020-08-31 NOTE — ED PROVIDER NOTES
Patient Seen in: [facility] AND [facility] Emergency Department    History   Patient presents with:  Low Back Pain      HPI    Patient presents to the ED complaining of chronic low back pain and diffuse weakness.   He states that he was recently discharged home f times daily. , Disp: , Rfl: , 8/22/2020 at Unknown time         No family history on file.     Smoking Status: Social History    Socioeconomic History      Marital status:       Spouse name: Not on file      Number of children: Not on file      Years time.   Skin: Skin is warm and dry. Psychiatric: He has a normal mood and affect. His behavior is normal.   Nursing note and vitals reviewed.       ED Course        Labs Reviewed   BASIC METABOLIC PANEL (8) - Abnormal; Notable for the following components °C)   97.8 °F (36.6 °C)   TempSrc: Temporal   Oral   SpO2: 100% 97% 98% 98%   Weight: 63.5 kg      Height: 182.9 cm (6')        *I personally reviewed and interpreted all ED vitals.     Pulse Ox: 98%, Room air, Normal     Monitor Interpretation:   normal si

## 2020-08-31 NOTE — ED NOTES
Patient educated on nursing facilities and the various options available for his care. Patient reports he does not want to go to a nursing home.  Patient informed that since he is unable to walk he would be requiring more care than independent living would

## 2020-08-31 NOTE — ED NOTES
Orders for admission, patient is aware of plan and ready to go upstairs. Any questions, please call ED RN Lanney Holter  at extension [phone].

## 2020-08-31 NOTE — H&P
[address] Patient Status:  Observation    1/15/1950 MRN [id number]   Location [facility] 4W/SW/SE Attending Ignacia Benton MD   [facility] Day # 0 PCP Salomon Rice MD, MD     Note en level.  Persistent mild erythema right lower extremity, less intense but more diffuse than last week. Coherent. Motor exam grossly nonfocal.  Normal affect and behavior. Diffuse severe rheumatoid deformities all distal extremities.   Data:  Reports from in 4-6 weeks   All/Imm · Allergies - PCN, adhesive tape, Norco.  · RA - Late–stage. Vision ·     Hearing · Hearing impairment - Mild–moderate.  Hearing functional.  Further intervention on request as indicated.    Neuro ·     Psych ·     MS · *  Low back

## 2020-08-31 NOTE — PROGRESS NOTES
Pt arrived to unit a/ox4, VS stable. Pt mentions he currently has home health services, will need wound consulted for bilateral lower extremities. Mepliex applied to sacrum. MD paged for admission orders. Pt has condom catheter in place.  Safety precautions

## 2020-08-31 NOTE — PHYSICAL THERAPY NOTE
PHYSICAL THERAPY EVALUATION - INPATIENT     Room Number: 268/808-K  Evaluation Date: 8/31/2020  Type of Evaluation: Initial   Physician Order: PT Eval and Treat    Presenting Problem: weakness and back pain  Reason for Therapy: Mobility Dysfunction and Fayetta Davis discharge. DISCHARGE RECOMMENDATIONS  PT Discharge Recommendations: 24 hour care/supervision;Sub-acute rehabilitation    PLAN  PT Treatment Plan: Bed mobility; Body mechanics; Patient education;Gait training;Balance training;Transfer training  Rehab [place] limits BLE WNL    Lower extremity strength is within functional limits BLE WNL    BALANCE  Static Sitting: Good  Dynamic Sitting: Fair +  Static Standing: Fair  Dynamic Standing: Fair -    AM-PAC '6-Clicks' INPATIENT SHORT FORM - BASIC MOBILITY  How much d supervision   Goal #4   Current Status    Goal #5 Patient to demonstrate independence with home activity/exercise instructions provided to patient in preparation for discharge.    Goal #5   Current Status    Goal #6    Goal #6  Current Status

## 2020-08-31 NOTE — ED NOTES
Patient reports hx of arthritis, admitted here in the last week for foot wounds, reports he got up to use his walker yesterday and had increased pain to his medial lower back and difficulty walking.  Patient denies abd pain, n/v/d, fever, cough, urinary s/s

## 2020-09-01 ENCOUNTER — APPOINTMENT (OUTPATIENT)
Dept: GENERAL RADIOLOGY | Facility: HOSPITAL | Age: 70
End: 2020-09-01
Attending: INTERNAL MEDICINE
Payer: MEDICARE

## 2020-09-01 PROCEDURE — 87040 BLOOD CULTURE FOR BACTERIA: CPT | Performed by: INTERNAL MEDICINE

## 2020-09-01 PROCEDURE — 80053 COMPREHEN METABOLIC PANEL: CPT | Performed by: INTERNAL MEDICINE

## 2020-09-01 PROCEDURE — 96372 THER/PROPH/DIAG INJ SC/IM: CPT

## 2020-09-01 PROCEDURE — 85025 COMPLETE CBC W/AUTO DIFF WBC: CPT | Performed by: INTERNAL MEDICINE

## 2020-09-01 PROCEDURE — 73630 X-RAY EXAM OF FOOT: CPT | Performed by: INTERNAL MEDICINE

## 2020-09-01 PROCEDURE — 99212 OFFICE O/P EST SF 10 MIN: CPT

## 2020-09-01 NOTE — DIETARY NOTE
ADULT NUTRITION INITIAL ASSESSMENT    Pt is at high nutrition risk. Pt meets severe malnutrition criteria.       CRITERIA FOR MALNUTRITION DIAGNOSIS:  Criteria for severe malnutrition diagnosis: chronic illness related to body fat severe depletion and musc NEEDS: Dosing wt (current wt ) = 63.6 kg  o Calories:4002-6013 (30-35 calorie/kg current wt)  o Protein:  grams protein/day (1.5-1.8 grams protein per kg Current wt)  - Diet: Regular/General  - RD Malnutrition Care Plan: Encouraged increased PO intak (140 lb)  08/22/20 : 62.8 kg (138 lb 6.4 oz)  09/25/19 : 61 kg (134 lb 9 oz)  08/22/19 : 57.9 kg (127 lb 11.2 oz)    GASTROINTESTINAL: GI intact    FOOD/NUTRITION RELATED HISTORY:  Appetite: Fair to good-improving  Intake:  Percent Meals Eaten (last 3 days labs WNL and support wound healing    DIETITIAN TO FOLLOW.      [address], [address]   Clinical Dietitian [id number]

## 2020-09-01 NOTE — WOUND PROGRESS NOTE
Pt seen for santyl application to the right medial ankle, right lateral ankle, right foot wounds. Ointment applied, covered with saline moist gauze. sensicare was applied to the gino wound. The toes were  with dry 2x2 gauze.  Dressings were secured

## 2020-09-01 NOTE — PROGRESS NOTES
Patient stated that he has had good care with nursing staff. Hopes that he will have the same care at the rehab facility. Patient is Evangelical and the rehab facility has a [address] [place] [address] 5 chaplains which is important to him.    provided active

## 2020-09-01 NOTE — CM/SW NOTE
SEVERIANO sent referrals out for SNF facilities. Pt's MD is requesting Heena العلي to present SNF list for patient to review     Mayito Ayala,    PLAN: DC SNF - pending patients choice    Susan Rowe, ROBERTW, MSW ext.  [phone]

## 2020-09-01 NOTE — PLAN OF CARE
VSS, no acute events overnight. Pt is aox4, ambulating x2a with RW. Voiding via condom cath. Heparin subq for DVT prophylaxis. Ibuprofen scheduled for pain. Pt plans to d/c to a SNF pending placement. Disease process discussed with pt.  Bed in [facility] positi of fever/infection during anticipated neutropenic period  Description  INTERVENTIONS  - Monitor WBC  - Administer growth factors as ordered  - Implement neutropenic guidelines  Outcome: Progressing     Problem: SAFETY ADULT - FALL  Goal: Free from fall inj

## 2020-09-01 NOTE — CM/SW NOTE
SEVERIANO spoke with Dr. Marva Martinez about discharge plans regarding [facility]. SEVERIANO followed up with pt via telephone to make sure that pt was agreeable to going to [facility] for [employer].  SEVERIANO presented pt with his TOMAS options; pt wanted to know about [place] [facility]

## 2020-09-01 NOTE — WOUND PROGRESS NOTE
WOUND CARE NOTE    History:  Past Medical History:   Diagnosis Date   • Anemia    • Arthritis    • COPD (chronic obstructive pulmonary disease) ([facility] [address]    • Disorder of thyroid    • Multiple wounds    • Nasal congestion    • Rheumatic arteritis    • Rheuma medial ankle, dorsal/medial foot and dorsal foot/toes 1-4. Wounds were cleansed, traced and measured, dressed with saline moist gauze. Recommend application of Santyl to assist with debridement of yellow slough areas.  May consider application of zinc to pe

## 2020-09-01 NOTE — PROGRESS NOTES
[address] [facility] Patient Status:  Observation    1/15/1950 MRN [id number]   Location [facility] Attending Jannie Gonzalez MD   Hosp Day # 0 PCP Mg Cazares MD, MD     Note entered on 2020.   Charmaine PACs.  Resolved  following initiation of thyroid HRT. · Venous insufficiency - With recurrent ulcers and cellulitis.  Control cellulitis, reduce edema.    Lymph ·     Heme · Anemia - Macrocytic.  Present prior to rehydration worse following rehydration.  D 8/19/2019. · Short-term plan - Acute care in hospital, then most likely will have short course of TOMAS at SNF. · Long-term plan - Continue living as independently as possible with his sister Roberta Seo in their own home. HISTORY   CC/HPI: No new complaints.

## 2020-09-02 ENCOUNTER — APPOINTMENT (OUTPATIENT)
Dept: MRI IMAGING | Facility: HOSPITAL | Age: 70
End: 2020-09-02
Attending: INTERNAL MEDICINE
Payer: MEDICARE

## 2020-09-02 PROCEDURE — 96374 THER/PROPH/DIAG INJ IV PUSH: CPT

## 2020-09-02 PROCEDURE — 87186 SC STD MICRODIL/AGAR DIL: CPT | Performed by: INTERNAL MEDICINE

## 2020-09-02 PROCEDURE — 83735 ASSAY OF MAGNESIUM: CPT | Performed by: INTERNAL MEDICINE

## 2020-09-02 PROCEDURE — 87075 CULTR BACTERIA EXCEPT BLOOD: CPT | Performed by: INTERNAL MEDICINE

## 2020-09-02 PROCEDURE — 73721 MRI JNT OF LWR EXTRE W/O DYE: CPT | Performed by: INTERNAL MEDICINE

## 2020-09-02 PROCEDURE — 87205 SMEAR GRAM STAIN: CPT | Performed by: INTERNAL MEDICINE

## 2020-09-02 PROCEDURE — 96376 TX/PRO/DX INJ SAME DRUG ADON: CPT

## 2020-09-02 PROCEDURE — 80048 BASIC METABOLIC PNL TOTAL CA: CPT | Performed by: INTERNAL MEDICINE

## 2020-09-02 PROCEDURE — 87076 CULTURE ANAEROBE IDENT EACH: CPT | Performed by: INTERNAL MEDICINE

## 2020-09-02 PROCEDURE — 96372 THER/PROPH/DIAG INJ SC/IM: CPT

## 2020-09-02 PROCEDURE — 85025 COMPLETE CBC W/AUTO DIFF WBC: CPT | Performed by: INTERNAL MEDICINE

## 2020-09-02 PROCEDURE — 87070 CULTURE OTHR SPECIMN AEROBIC: CPT | Performed by: INTERNAL MEDICINE

## 2020-09-02 PROCEDURE — 87077 CULTURE AEROBIC IDENTIFY: CPT | Performed by: INTERNAL MEDICINE

## 2020-09-02 NOTE — PLAN OF CARE
Problem: Patient Centered Care  Goal: Patient preferences are identified and integrated in the patient's plan of care  Description  Interventions:  - What would you like us to know as we care for you?  From home with sister, pt feels he is no longer able behaviors that affect risk of falls.   - [place] fall precautions as indicated by assessment.  - Educate pt/family on patient safety including physical limitations  - Instruct pt to call for assistance with activity based on assessment  - Modify environme Robyn ordered blood cultures, added ID consult, and ordered ankle MRI. Patient has screws in his foot, will endorse to dayshift RN to call MRI to see if able to still do an MRI on the patient. IVF.

## 2020-09-02 NOTE — PROGRESS NOTES
[facility] Pharmacy Note:  Renal Adjustment for meropenem (MERREM)    Lorenzo Duran is a 78year old patient who has been prescribed meropenem (MERREM) 1000 mg every 8 hrs.   CrCl is estimated creatinine clearance is 114.4 mL/min (A) (based on SCr of 0.64 mg/dL (

## 2020-09-02 NOTE — CONSULTS
[facility]    PATIENT'S NAME: Clyde Echeverria   ATTENDING PHYSICIAN: Katherine Fine.  Berta Blackburn MD   CONSULTING PHYSICIAN: Stephanie Montelongo MD   PATIENT ACCOUNT#:   [id number]    LOCATION:  [address] [place] [address] RECORD #:   [id number]       DATE OF BIRTH:  [birth year] Penicillin, cannot tell me the exact reaction. SOCIAL HISTORY:  He is . No smoking, alcohol, or any IV drug use. REVIEW OF SYSTEMS:  Patient on a comprehensive review of systems not too informative.   He is saying that he has been having thes 10:38:37  Job [id number]  BC/

## 2020-09-02 NOTE — CM/SW NOTE
SW spoke with Dr. Eve Watts about discharge plans. Potentially ready for discharge today. SW spoke to Lc Brown at [facility], they don't have a bed available today, but will tomorrow. They had to cancel one of their discharges.  Updated RN and Dr. Eve Watts via bubble ch

## 2020-09-03 VITALS
SYSTOLIC BLOOD PRESSURE: 108 MMHG | HEART RATE: 57 BPM | BODY MASS INDEX: 21.85 KG/M2 | TEMPERATURE: 98 F | WEIGHT: 161.31 LBS | OXYGEN SATURATION: 95 % | RESPIRATION RATE: 18 BRPM | DIASTOLIC BLOOD PRESSURE: 57 MMHG | HEIGHT: 72 IN

## 2020-09-03 PROCEDURE — 80048 BASIC METABOLIC PNL TOTAL CA: CPT | Performed by: INTERNAL MEDICINE

## 2020-09-03 PROCEDURE — 85025 COMPLETE CBC W/AUTO DIFF WBC: CPT | Performed by: INTERNAL MEDICINE

## 2020-09-03 PROCEDURE — 96376 TX/PRO/DX INJ SAME DRUG ADON: CPT

## 2020-09-03 PROCEDURE — 96372 THER/PROPH/DIAG INJ SC/IM: CPT

## 2020-09-03 RX ORDER — LEVOFLOXACIN 750 MG/1
750 TABLET ORAL DAILY
Refills: 0 | Status: ON HOLD | COMMUNITY
Start: 2020-09-04 | End: 2020-11-02

## 2020-09-03 RX ORDER — HEPARIN SODIUM 5000 [USP'U]/ML
5000 INJECTION, SOLUTION INTRAVENOUS; SUBCUTANEOUS EVERY 8 HOURS SCHEDULED
Refills: 0 | Status: ON HOLD | COMMUNITY
Start: 2020-09-03 | End: 2020-11-02

## 2020-09-03 RX ORDER — IBUPROFEN 400 MG/1
400 TABLET ORAL EVERY 8 HOURS PRN
Refills: 0 | Status: SHIPPED | COMMUNITY
Start: 2020-09-03

## 2020-09-03 RX ORDER — DOXYCYCLINE HYCLATE 100 MG/1
100 CAPSULE ORAL 2 TIMES DAILY
Refills: 0 | Status: ON HOLD | COMMUNITY
Start: 2020-09-03 | End: 2020-11-02

## 2020-09-03 RX ORDER — LEVOFLOXACIN 750 MG/1
750 TABLET ORAL DAILY
Status: DISCONTINUED | OUTPATIENT
Start: 2020-09-03 | End: 2020-09-03

## 2020-09-03 RX ORDER — IBUPROFEN 400 MG/1
400 TABLET ORAL
Refills: 0 | Status: SHIPPED | COMMUNITY
Start: 2020-09-03 | End: 2020-09-03

## 2020-09-03 NOTE — DISCHARGE SUMMARY
[facility]    Discharge Summary    Eladia Yeboah Patient Status:  Observation    1/15/1950 MRN [id number]   Location [facility] Attending Michelle Kumar MD   Hosp Day # 0 PCP July Triana MD, MD     Date of Admission: 2020 acute illness left him compromised, in weakened condition, with difficulty rising and walking on his own. He was advised to go to SNF for TOMAS. He refused. Discharged home 8/25/2020 with 4 days of antibiotic, finished AM prior to readmission.   Tramaine hess Heparin Sodium (Porcine) 5000 UNIT/ML Soln      Inject 1 mL (5,000 Units total) into the skin every 8 (eight) hours.    Refills:  0     ibuprofen 400 MG Tabs  Commonly known as:  MOTRIN      Take 1 tablet (400 mg total) by mouth every 8 (eight) hours as n

## 2020-09-03 NOTE — OCCUPATIONAL THERAPY NOTE
Pt not seen for OT at this time secondary to 2 attemts, and then pt declining, stating 'I am on the bedpan, then I am going to eat lunch, then I will go to [facility]. '

## 2020-09-03 NOTE — CM/SW NOTE
Spoke with patient regarding discharge plan. He prefers [facility]. Spoke with Darren Hurst, patient is accepted at [place] [id number] on beds for today. Will need to discuss w/ MD regarding dc date. 1145am: Per MD, discharge today.  Updated Darren Hurst at

## 2020-09-03 NOTE — PLAN OF CARE
Pt alert and oriented but refused q2 turns. Pt transitioned to PO antibiotics today. Dressings changed and c/d/I/.  Pt has large scrotal swelling which pt states is from a hernia he has had for years and plans to have surgery on once all his wounds are heal Encourage pt to monitor pain and request assistance  - Assess pain using appropriate pain scale  - Administer analgesics based on type and severity of pain and evaluate response  - Implement non-pharmacological measures as appropriate and evaluate response assist at discharge as needed  - Consider post-discharge preferences of patient/family/discharge partner  - Complete POLST form as appropriate  - Assess patient's ability to be responsible for managing their own health  - Refer to Case Management Deangelo Galeano

## 2020-09-03 NOTE — PROGRESS NOTES
MD notified of swelling. IVF discontinued prior to contact. Orders received to DC IVF but no orders or confirmation if the patient will need a diuretic. MD aware that [facility] will not accept if pt will need a diuretic. Awaiting MD response.  This RN updat

## 2020-09-03 NOTE — PLAN OF CARE
Problem: Patient Centered Care  Goal: Patient preferences are identified and integrated in the patient's plan of care  Description  Interventions:  - What would you like us to know as we care for you?  From home with sister, pt feels he is no longer able interventions unsuccessful or patient reports new pain  - Anticipate increased pain with activity and pre-medicate as appropriate  Outcome: Progressing     Problem: SAFETY ADULT - FALL  Goal: Free from fall injury  Description  INTERVENTIONS:  - Assess pt

## 2020-09-04 NOTE — PROGRESS NOTES
[address] [facility] Patient Status:  Observation    1/15/1950 MRN [id number]   Location [facility] Attending No att. providers found   [facility] Day # 0 PCP Shayna Carrillo MD, MD     Note entered on 2020. hours. Continue IVF. · Bradycardia - PMH 2019.  Probably at least partly from hypothyroidism.  Asymptomatic, with PACs.  Resolved  following initiation of thyroid HRT.   · Venous insufficiency - With recurrent ulcers and cellulitis.  Control cellulitis, r constant risk for infection. Check MRI. · Foot abscess - [facility] 2019; nonrecurrent. · *  [address] [facility] 2019. With cellulitis and sepsis last week as well as persistent elevation of WBC, ESR and CRP, may be recurrent now. Check x-ray. May need MRI.

## 2020-09-08 ENCOUNTER — SNF VISIT (OUTPATIENT)
Dept: INTERNAL MEDICINE CLINIC | Facility: SKILLED NURSING FACILITY | Age: 70
End: 2020-09-08

## 2020-09-08 DIAGNOSIS — M06.9 RHEUMATOID ARTHRITIS, INVOLVING UNSPECIFIED SITE, UNSPECIFIED RHEUMATOID FACTOR PRESENCE: ICD-10-CM

## 2020-09-08 DIAGNOSIS — R53.1 WEAKNESS GENERALIZED: ICD-10-CM

## 2020-09-08 DIAGNOSIS — L03.115 CELLULITIS OF RIGHT LEG: ICD-10-CM

## 2020-09-08 DIAGNOSIS — E46 MALNUTRITION, UNSPECIFIED TYPE (HCC): ICD-10-CM

## 2020-09-08 PROCEDURE — 1123F ACP DISCUSS/DSCN MKR DOCD: CPT | Performed by: NURSE PRACTITIONER

## 2020-09-08 PROCEDURE — 99309 SBSQ NF CARE MODERATE MDM 30: CPT | Performed by: NURSE PRACTITIONER

## 2020-09-08 PROCEDURE — 1111F DSCHRG MED/CURRENT MED MERGE: CPT | Performed by: NURSE PRACTITIONER

## 2020-09-08 NOTE — PROGRESS NOTES
HPI: [place] is a 79 yo male who was admitted to [address] [place] [address] with low back pain and worsening of lower extremity pain.  He has Severe acute-on-chronic mobility impairment due to crippling complications of rheumatoid arthritis combined with osteoarthritis, th EMR  VITALS: Reviewed   LABS/Imaging: Reviewed     SUBJECTIVE/REVIEW OF SYSTEMS:  Seen in room. Currently reports no pain. Denies sob/cough/cp/palpitations. Concerned about swelling in his legs.  Has dietary concerns/requesting specific foods and spoke with

## 2020-11-02 ENCOUNTER — APPOINTMENT (OUTPATIENT)
Dept: CT IMAGING | Facility: HOSPITAL | Age: 70
End: 2020-11-02
Attending: EMERGENCY MEDICINE
Payer: MEDICARE

## 2020-11-02 ENCOUNTER — HOSPITAL ENCOUNTER (OUTPATIENT)
Facility: HOSPITAL | Age: 70
Setting detail: OBSERVATION
Discharge: NURSING, REHAB OR LONG-TERM CARE | End: 2020-11-03
Attending: EMERGENCY MEDICINE | Admitting: INTERNAL MEDICINE
Payer: MEDICARE

## 2020-11-02 DIAGNOSIS — N30.01 ACUTE CYSTITIS WITH HEMATURIA: Primary | ICD-10-CM

## 2020-11-02 PROCEDURE — 96365 THER/PROPH/DIAG IV INF INIT: CPT

## 2020-11-02 PROCEDURE — 84439 ASSAY OF FREE THYROXINE: CPT | Performed by: INTERNAL MEDICINE

## 2020-11-02 PROCEDURE — 74176 CT ABD & PELVIS W/O CONTRAST: CPT | Performed by: EMERGENCY MEDICINE

## 2020-11-02 PROCEDURE — 81001 URINALYSIS AUTO W/SCOPE: CPT | Performed by: EMERGENCY MEDICINE

## 2020-11-02 PROCEDURE — 85025 COMPLETE CBC W/AUTO DIFF WBC: CPT | Performed by: EMERGENCY MEDICINE

## 2020-11-02 PROCEDURE — 96361 HYDRATE IV INFUSION ADD-ON: CPT

## 2020-11-02 PROCEDURE — 84443 ASSAY THYROID STIM HORMONE: CPT | Performed by: INTERNAL MEDICINE

## 2020-11-02 PROCEDURE — 80048 BASIC METABOLIC PNL TOTAL CA: CPT | Performed by: EMERGENCY MEDICINE

## 2020-11-02 PROCEDURE — 99291 CRITICAL CARE FIRST HOUR: CPT

## 2020-11-02 PROCEDURE — 87086 URINE CULTURE/COLONY COUNT: CPT | Performed by: EMERGENCY MEDICINE

## 2020-11-02 NOTE — ED INITIAL ASSESSMENT (HPI)
Pt brought in by [facility] for possible UTI. Pt has groin hernia to the left side. Pt is A/O x 4. Breathing is unlabored. + pain in urination started few days ago. Pt has wound to the left ankle, and wound left shin   with dressing.   Pt has + ulcer t

## 2020-11-03 VITALS
HEART RATE: 60 BPM | OXYGEN SATURATION: 99 % | DIASTOLIC BLOOD PRESSURE: 64 MMHG | HEIGHT: 69 IN | WEIGHT: 135.5 LBS | SYSTOLIC BLOOD PRESSURE: 104 MMHG | BODY MASS INDEX: 20.07 KG/M2 | RESPIRATION RATE: 20 BRPM | TEMPERATURE: 98 F

## 2020-11-03 PROCEDURE — 96372 THER/PROPH/DIAG INJ SC/IM: CPT

## 2020-11-03 PROCEDURE — 80048 BASIC METABOLIC PNL TOTAL CA: CPT | Performed by: INTERNAL MEDICINE

## 2020-11-03 PROCEDURE — 82962 GLUCOSE BLOOD TEST: CPT

## 2020-11-03 RX ORDER — ASCORBIC ACID 500 MG
2000 TABLET ORAL DAILY
Status: DISCONTINUED | OUTPATIENT
Start: 2020-11-03 | End: 2020-11-03

## 2020-11-03 RX ORDER — HEPARIN SODIUM 5000 [USP'U]/ML
5000 INJECTION, SOLUTION INTRAVENOUS; SUBCUTANEOUS EVERY 8 HOURS SCHEDULED
Status: DISCONTINUED | OUTPATIENT
Start: 2020-11-03 | End: 2020-11-03

## 2020-11-03 RX ORDER — FOLIC ACID/VIT B COMPLEX AND C 400 MCG
1 TABLET ORAL DAILY
Status: DISCONTINUED | OUTPATIENT
Start: 2020-11-03 | End: 2020-11-03

## 2020-11-03 RX ORDER — IBUPROFEN 400 MG/1
400 TABLET ORAL EVERY 8 HOURS PRN
Status: DISCONTINUED | OUTPATIENT
Start: 2020-11-03 | End: 2020-11-03

## 2020-11-03 RX ORDER — HEPARIN SODIUM 5000 [USP'U]/ML
5000 INJECTION, SOLUTION INTRAVENOUS; SUBCUTANEOUS EVERY 8 HOURS SCHEDULED
Refills: 0 | Status: SHIPPED | COMMUNITY
Start: 2020-11-03

## 2020-11-03 RX ORDER — METHYLPREDNISOLONE 4 MG
4 TABLET, DOSE PACK ORAL DAILY
Status: DISCONTINUED | OUTPATIENT
Start: 2020-11-03 | End: 2020-11-03 | Stop reason: RX

## 2020-11-03 RX ORDER — GARLIC EXTRACT 500 MG
1 CAPSULE ORAL 2 TIMES DAILY
Status: DISCONTINUED | OUTPATIENT
Start: 2020-11-03 | End: 2020-11-03

## 2020-11-03 NOTE — ED PROVIDER NOTES
Patient Seen in: [facility] AND [facility] Emergency Department      History   Patient presents with:  Urinary Symptoms    Stated Complaint: dysuria, incontinence    HPI    78 yoM with rheumatoid arthritis, venous insufficiency and recent admission for celluliti note reviewed. Constitutional:       Appearance: He is well-developed. HENT:      Head: Normocephalic and atraumatic. Neck:      Musculoskeletal: Normal range of motion. Cardiovascular:      Rate and Rhythm: Normal rate and regular rhythm.       Hea Narrative: The following orders were created for panel order CBC WITH DIFFERENTIAL WITH PLATELET.   Procedure                               Abnormality         Status                     ---------                               -----------         ------ admission however he would not be accepted until tomorrow morning. Discussed with Dr. Norris Tavarez for overnight observation until SNF placement.            Disposition and Plan     Clinical Impression:  Acute cystitis with hematuria  (primary encounter emily

## 2020-11-03 NOTE — PROGRESS NOTES
Pharmacy Note: Dietary Supplement Discontinuation Per Policy    Calcium-Magnesium-Zing 400-250-12.5 mg tabs has been discontinued on [address] per policy. This supplement may be restarted upon discharge using the medication reconciliation process.

## 2020-11-03 NOTE — CM/SW NOTE
11/3 300pm: The pt. [facility]. The pt. Is aware and agreeable.   ------------------  11/3 1013am: The pt's medicar has been put on will call. SW notified [facility] of the above.   The pt's nurse

## 2020-11-03 NOTE — WOUND PROGRESS NOTE
WOUND CARE NOTE    History:  Past Medical History:   Diagnosis Date   • Anemia    • Arthritis    • COPD (chronic obstructive pulmonary disease) ([facility] [address]    • Disorder of thyroid    • Multiple wounds    • Nasal congestion    • Rheumatic arteritis    • Rheuma finished lunch, the nurse is at the bedside, he is known to wound care services from previous admission. 1+ edema to Lower legs, the pt's feet with bilateral  Charcot/club foot. No c/o pain at this time.  Legs have been elevated in bed, the dressings are

## 2020-11-03 NOTE — DISCHARGE SUMMARY
[facility]    Discharge Summary    Olivier Saha Patient Status:  Observation    1/15/1950 MRN [id number]   Location [facility] 1W Attending Teofilo Ho MD   Hosp Day # 0 PCP Rand Poe MD, MD     Date of Admission: 2020 care.    Hospital Course: No change in condition overall, except possibly a little brighter after IV rehydration. Seen by wound care service for lower extremity venous ulcers.     Consultations: Wound care service    Procedures: Wound care, IV fluid infusi antibiotics according to results of urine culture, continue wound care.     Rosemarie Cheatham MD  11/3/2020  12:02 PM

## 2020-11-03 NOTE — PLAN OF CARE
Report called to [address] at [facility] , pt aware of POC .  Awaiting transport

## 2020-11-03 NOTE — H&P
[address] Patient Status:  Emergency    1/15/1950 MRN [id number]   Location [address] Attending Chasidy Hart MD   Hosp Day # 0 PCP Izabel Tam MD, MD Resolve acute illness, then provide TOMAS. Integ · Skin ulcers - Chronic. Likely due to venous edema. Control edema and prevent recurrence. Wound care, including close monitoring for infection. Resp ·    GI ·    Urinary · *  Hematuria - Acute.   With d residence in his own home when he is able to meet personal care needs there.     Lissette Mckeon MD  11/3/2020

## 2020-11-03 NOTE — CM/SW NOTE
[facility] and patient request    [facility] unable to accept    Referral placed to [facility] who medically accepted patient but states \"we need to verify the [facility] waiver prior to patient coming here.  We can take t

## 2020-11-03 NOTE — PLAN OF CARE
Pt admitted for observation and UTI. Received dose of Rocephin in ED. Plan is for pt to be discharged to [facility] at 10 am. Pt has multpile wounds to bilateral legs and feet with intact dressings in place.  Per the patient, dressings are spears

## 2020-11-03 NOTE — ED NOTES
Orders for admission, patient is aware of plan and ready to go upstairs. Any questions, please call ED JOSÉ DELGADO  at extension [phone]. Type of COVID test sent:  COVID Suspicion level: Low, Rapid COVID test Not detected.     Titratable drug(s) infusing: NONE

## 2020-11-04 NOTE — PLAN OF CARE
[facility] here, transported to [facility] awake, alert and in apparent satis condition

## 2020-12-03 ENCOUNTER — HOSPITAL ENCOUNTER (EMERGENCY)
Facility: HOSPITAL | Age: 70
Discharge: HOME | End: 2020-12-03
Attending: EMERGENCY MEDICINE
Payer: MEDICARE

## 2020-12-03 VITALS
TEMPERATURE: 97 F | HEART RATE: 59 BPM | RESPIRATION RATE: 17 BRPM | HEIGHT: 72 IN | WEIGHT: 140 LBS | BODY MASS INDEX: 18.96 KG/M2 | DIASTOLIC BLOOD PRESSURE: 78 MMHG | OXYGEN SATURATION: 100 % | SYSTOLIC BLOOD PRESSURE: 109 MMHG

## 2020-12-03 DIAGNOSIS — E86.0 DEHYDRATION: ICD-10-CM

## 2020-12-03 DIAGNOSIS — R19.7 DIARRHEA, UNSPECIFIED TYPE: Primary | ICD-10-CM

## 2020-12-03 DIAGNOSIS — N17.9 AKI (ACUTE KIDNEY INJURY) (HCC): ICD-10-CM

## 2020-12-03 PROCEDURE — 87493 C DIFF AMPLIFIED PROBE: CPT | Performed by: EMERGENCY MEDICINE

## 2020-12-03 PROCEDURE — 87427 SHIGA-LIKE TOXIN AG IA: CPT | Performed by: EMERGENCY MEDICINE

## 2020-12-03 PROCEDURE — 99285 EMERGENCY DEPT VISIT HI MDM: CPT

## 2020-12-03 PROCEDURE — 84443 ASSAY THYROID STIM HORMONE: CPT | Performed by: EMERGENCY MEDICINE

## 2020-12-03 PROCEDURE — 80053 COMPREHEN METABOLIC PANEL: CPT | Performed by: EMERGENCY MEDICINE

## 2020-12-03 PROCEDURE — 83735 ASSAY OF MAGNESIUM: CPT | Performed by: EMERGENCY MEDICINE

## 2020-12-03 PROCEDURE — 96360 HYDRATION IV INFUSION INIT: CPT

## 2020-12-03 PROCEDURE — 96361 HYDRATE IV INFUSION ADD-ON: CPT

## 2020-12-03 PROCEDURE — 87045 FECES CULTURE AEROBIC BACT: CPT | Performed by: EMERGENCY MEDICINE

## 2020-12-03 PROCEDURE — 87046 STOOL CULTR AEROBIC BACT EA: CPT | Performed by: EMERGENCY MEDICINE

## 2020-12-03 PROCEDURE — 85025 COMPLETE CBC W/AUTO DIFF WBC: CPT | Performed by: EMERGENCY MEDICINE

## 2020-12-03 RX ORDER — LOPERAMIDE HYDROCHLORIDE 2 MG/1
2 CAPSULE ORAL ONCE
Status: COMPLETED | OUTPATIENT
Start: 2020-12-03 | End: 2020-12-03

## 2020-12-03 RX ORDER — DICYCLOMINE HCL 20 MG
20 TABLET ORAL 4 TIMES DAILY PRN
Qty: 40 TABLET | Refills: 0 | Status: SHIPPED | OUTPATIENT
Start: 2020-12-03 | End: 2020-12-13

## 2020-12-03 NOTE — ED INITIAL ASSESSMENT (HPI)
[facility] with c/o diarrhea X1 week worse over the last few days denies bloody or black stool, vomiting, abdominal pain, fevers patient tolerating oral fluids at home

## 2020-12-03 NOTE — CM/SW NOTE
[facility] called back. The soonest they could look at his case would be tomorrow. He would need PT/OT amari and more Dr notes on the pt.     Mac Zaidi # [phone]

## 2020-12-03 NOTE — ED PROVIDER NOTES
Patient Seen in: [facility] Emergency Department    History   Patient presents with:  Nausea/Vomiting/Diarrhea    Stated Complaint: diarrhea     HPI    73-year-old male with past medical history of COPD, hypothyroid, rheumatoid arthritis presenting before meals. Glucosamine Sulfate 500 MG Oral Tab,  Take 4 tablets by mouth daily. Acidophilus/Pectin Oral Cap,  Take 1 capsule by mouth 2 (two) times daily. History reviewed. No pertinent family history.     Social History    Tobacco Use      S - Abnormal; Notable for the following components:    Magnesium 2.9 (*)     All other components within normal limits   CBC W/ DIFFERENTIAL - Abnormal; Notable for the following components:    RBC 3.29 (*)     HGB 10.3 (*)     HCT 33.5 (*)     .8 (*) patient with history of rheumatoid arthritis and with ADL limitations at baseline. Labs with mild SARAH BETH and otherwise nonacute with IVF initiated.  Patient voicing inability to care for self at home given RA limitations and not comfortable with extended care/

## 2020-12-03 NOTE — CM/SW NOTE
Spoke to Patricia at [facility]. She will call back in approx 30 min after she speaks to her team to accept the pt.

## 2020-12-03 NOTE — CM/SW NOTE
[facility] \[facility]\"" Rehab unit. Message left with admission to return call about possible admission for the pt. 5

## 2020-12-03 NOTE — CM/SW NOTE
The pt has been accepted to [facility]. I did mention this to the pt and he said he does not want to go to [facility]. He would like to go back to [facility]. I spoke to Dr Rhea Ceron and he mentioned that the [facility] will take him back.  I reached ou

## 2020-12-03 NOTE — CM/SW NOTE
The pt has chosen [facility] in [facility] for rehab. RN report: [phone]    Room #: [address] called.  ETA: 550pm    PCS completed

## 2020-12-03 NOTE — CM/SW NOTE
[facility] is unable to accept due to increase in [facility] cases. I spoke to the pt and he requested that I send his information to [facility]. I sent the referral to [facility].

## 2020-12-03 NOTE — CM/SW NOTE
SACHA MCCURDY asked this writer to speak to Pt regarding different options for care at home. Pt states he lives in a raised ranch home with his sister. There are 5 steps up into the home, but no stairs inside. Pt ambulates with a walker.  Pt tripped and fell ab

## 2021-01-08 ENCOUNTER — APPOINTMENT (OUTPATIENT)
Dept: GENERAL RADIOLOGY | Facility: HOSPITAL | Age: 71
End: 2021-01-08
Attending: EMERGENCY MEDICINE
Payer: MEDICARE

## 2021-01-08 ENCOUNTER — HOSPITAL ENCOUNTER (EMERGENCY)
Facility: HOSPITAL | Age: 71
Discharge: HOME | End: 2021-01-09
Attending: EMERGENCY MEDICINE
Payer: MEDICARE

## 2021-01-08 DIAGNOSIS — G89.29 HIP PAIN, CHRONIC, RIGHT: Primary | ICD-10-CM

## 2021-01-08 DIAGNOSIS — M25.551 HIP PAIN, CHRONIC, RIGHT: Primary | ICD-10-CM

## 2021-01-08 LAB
BASOPHILS # BLD AUTO: 0.01 X10(3) UL (ref 0–0.2)
BASOPHILS NFR BLD AUTO: 0.1 %
DEPRECATED RDW RBC AUTO: 63.3 FL (ref 35.1–46.3)
EOSINOPHIL # BLD AUTO: 0.02 X10(3) UL (ref 0–0.7)
EOSINOPHIL NFR BLD AUTO: 0.3 %
ERYTHROCYTE [DISTWIDTH] IN BLOOD BY AUTOMATED COUNT: 17.7 % (ref 11–15)
HCT VFR BLD AUTO: 30.4 %
HGB BLD-MCNC: 9.1 G/DL
IMM GRANULOCYTES # BLD AUTO: 0.04 X10(3) UL (ref 0–1)
IMM GRANULOCYTES NFR BLD: 0.6 %
LYMPHOCYTES # BLD AUTO: 0.51 X10(3) UL (ref 1–4)
LYMPHOCYTES NFR BLD AUTO: 7.3 %
MCH RBC QN AUTO: 30.5 PG (ref 26–34)
MCHC RBC AUTO-ENTMCNC: 29.9 G/DL (ref 31–37)
MCV RBC AUTO: 102 FL
MONOCYTES # BLD AUTO: 0.31 X10(3) UL (ref 0.1–1)
MONOCYTES NFR BLD AUTO: 4.4 %
NEUTROPHILS # BLD AUTO: 6.08 X10 (3) UL (ref 1.5–7.7)
NEUTROPHILS # BLD AUTO: 6.08 X10(3) UL (ref 1.5–7.7)
NEUTROPHILS NFR BLD AUTO: 87.3 %
PLATELET # BLD AUTO: 182 10(3)UL (ref 150–450)
RBC # BLD AUTO: 2.98 X10(6)UL
WBC # BLD AUTO: 7 X10(3) UL (ref 4–11)

## 2021-01-08 PROCEDURE — 99284 EMERGENCY DEPT VISIT MOD MDM: CPT

## 2021-01-08 PROCEDURE — 85025 COMPLETE CBC W/AUTO DIFF WBC: CPT | Performed by: EMERGENCY MEDICINE

## 2021-01-08 PROCEDURE — 36415 COLL VENOUS BLD VENIPUNCTURE: CPT

## 2021-01-08 PROCEDURE — 80053 COMPREHEN METABOLIC PANEL: CPT | Performed by: EMERGENCY MEDICINE

## 2021-01-08 PROCEDURE — 73502 X-RAY EXAM HIP UNI 2-3 VIEWS: CPT | Performed by: EMERGENCY MEDICINE

## 2021-01-09 VITALS
TEMPERATURE: 96 F | WEIGHT: 140 LBS | HEART RATE: 47 BPM | RESPIRATION RATE: 17 BRPM | OXYGEN SATURATION: 98 % | BODY MASS INDEX: 18.96 KG/M2 | HEIGHT: 72 IN | SYSTOLIC BLOOD PRESSURE: 119 MMHG | DIASTOLIC BLOOD PRESSURE: 70 MMHG

## 2021-01-09 LAB
ALBUMIN SERPL-MCNC: 2.5 G/DL (ref 3.4–5)
ALBUMIN/GLOB SERPL: 0.6 {RATIO} (ref 1–2)
ALP LIVER SERPL-CCNC: 188 U/L
ALT SERPL-CCNC: 25 U/L
ANION GAP SERPL CALC-SCNC: 3 MMOL/L (ref 0–18)
AST SERPL-CCNC: 25 U/L (ref 15–37)
BILIRUB SERPL-MCNC: 0.4 MG/DL (ref 0.1–2)
BUN BLD-MCNC: 28 MG/DL (ref 7–18)
BUN/CREAT SERPL: 32.6 (ref 10–20)
CALCIUM BLD-MCNC: 9.3 MG/DL (ref 8.5–10.1)
CHLORIDE SERPL-SCNC: 115 MMOL/L (ref 98–112)
CO2 SERPL-SCNC: 30 MMOL/L (ref 21–32)
CREAT BLD-MCNC: 0.86 MG/DL
GLOBULIN PLAS-MCNC: 4.3 G/DL (ref 2.8–4.4)
GLUCOSE BLD-MCNC: 114 MG/DL (ref 70–99)
M PROTEIN MFR SERPL ELPH: 6.8 G/DL (ref 6.4–8.2)
OSMOLALITY SERPL CALC.SUM OF ELEC: 312 MOSM/KG (ref 275–295)
POTASSIUM SERPL-SCNC: 3.4 MMOL/L (ref 3.5–5.1)
SARS-COV-2 RNA RESP QL NAA+PROBE: NOT DETECTED
SODIUM SERPL-SCNC: 148 MMOL/L (ref 136–145)

## 2021-01-09 NOTE — ED NOTES
Pt declining extended care admission. Furthermore, pt has been restricted from multiple area extended care facilities d/t behavior toward clinical staff.

## 2021-01-09 NOTE — ED NOTES
Pt provided and explained d/c instructions, at-home care, follow-up, and otc rx. Pt in nad at this time. Iv access d/c. Vss. Hernandez. Pt discharge home via [facility]. A&ox3. Belongings with pt. All questions and concerns addressed.

## 2021-01-09 NOTE — ED PROVIDER NOTES
Patient Seen in: [facility] Emergency Department    History   Patient presents with:  Musculoskeletal Problem      HPI    The patient presents to the ED complaining of stiffness to his right hip over the past 2 weeks and now having more difficulty Vitals [01/08/21 2315]   /62   Pulse (!) 43   Resp 16   Temp (!) 95.7 °F (35.4 °C)   Temp src Temporal   SpO2 100 %   O2 Device None (Room air)       All measures to prevent infection transmission during my interaction with the patient were taken.  Jonathan Roland components within normal limits   CBC W/ DIFFERENTIAL - Abnormal; Notable for the following components:    RBC 2.98 (*)     HGB 9.1 (*)     HCT 30.4 (*)     .0 (*)     MCHC 29.9 (*)     RDW-SD 63.3 (*)     RDW 17.7 (*)     Lymphocyte Absolute 0.51 ( sclerosis/heterotopic ossification; also a probable old fracture deformity      Results faxed at 01:41 AM ET. Can call [phone] if questions.     Ced Scott MD    ED Medications Administered: Medications - No data to display      MDM      01/09/21 encounter diagnosis)    Disposition:  Discharge    Follow-up:  Emilee Rivera MD  [address]  [place] [address]  [phone]    Schedule an appointment as soon as possible for a visit in 1 week        Medications Prescribed:  Discharge Medica

## 2021-01-09 NOTE — ED INITIAL ASSESSMENT (HPI)
Over the past two weeks pt noted decreased speed in ambulation. Over the past two days, increased difficulty with ambulation, and noted having to drag rle to ambulate/move it. Upon arrival, right leg noted to be externally rotated.

## 2021-06-12 NOTE — PROGRESS NOTES
[facility] Patient Status:  Inpatient    1/15/1950 MRN [id number]   Location [facility] 5SW/SE Attending Roselyn Martinez MD   [facility] Day # 1 PCP Mg Spangler MD, MD     Note entered on 2020.   [place] COVID 19 Nurse Triage Plan/Patient Instructions    Please be aware that novel coronavirus (COVID-19) may be circulating in the community. If you develop symptoms such as fever, cough, or SOB or if you have concerns about the presence of another infection including coronavirus (COVID-19), please contact your health care provider or visit [email or web address].     Disposition/Instructions    ED Visit recommended. Follow protocol based instructions.      Bring Your Own Device:  Please also bring your smart device(s) (smart phones, tablets, laptops) and their charging cables for your personal use and to communicate with your care team during your visit.      Thank you for taking steps to prevent the spread of this virus.  o Limit your contact with others.  o Wear a simple mask to cover your cough.  o Wash your hands well and often.    Resources    [facility]: About COVID-19: [email or web address]    CDC: What to Do If You're Sick: www.cdc.gov/coronavirus/2019-ncov/about/steps-when-sick.html    CDC: Ending Home Isolation: www.cdc.gov/coronavirus/2019-ncov/hcp/disposition-in-home-patients.html     CDC: Caring for Someone: www.cdc.gov/coronavirus/2019-ncov/if-you-are-sick/care-for-someone.html     [facility]: Interim Guidance for Hospital Discharge to Home: www.health.[facility].mn.us/diseases/coronavirus/hcp/hospdischarge.pdf    [facility] clinical trials (COVID-19 research studies): clinicalaffairs.[facility]-clinical-trials     Below are the COVID-19 hotlines at the [facility] ([facility]). Interpreters are available.   o For health questions: Call 430-927-9796 or 1-475.666.3592 (7 a.m. to 7 p.m.)  o For questions about schools and childcare: Call 385-901-3572 or 1-367.408.6182 (7 a.m. to 7 p.m.)       RN triage   Call from pt boyfriend Didier-- signed consent in chart  Pt 20 weeks pregnant and has not been sleeping for 1-2 weeks   Has back pain / entire back - and both legs / back of legs  hurt --   Pt is able to walk w/ pain -- and painful to sit   No leg swelling -- has some feet swelling -   Legs not red or warm to touch   No other swelling -- no facial or hand swelling   Has had headache for the past few days -- maybe constant -- maybe off and on  Has abd pain and cramping -- some constant pain and some cramping pain that comes and goes   Advised to take pt to ED now -- will take pt to  ED   Tana Villatoro RN [facility] Care Connection RN triage        Additional Information    Negative: Passed out (i.e., fainted, collapsed and was not responding)    Negative: Shock suspected (e.g., cold/pale/clammy skin, too weak to stand, low BP, rapid pulse)    Negative: Difficult to awaken or acting confused (e.g., disoriented, slurred speech)    Negative: Sounds like a life-threatening emergency to the triager    MILD constant abdominal pain (e.g., doesn't interfere with normal activities) and present > 2 hours    Intermittent lower abdominal pain lasting > 24 hours    Negative: Fever > 100.4 F (38.0 C)    Protocols used: PREGNANCY - ABDOMINAL PAIN LESS THAN 20 WEEKS EGA-A-OH       much due to osteoarthrosis as RA;  eventually may consider left hip replacement. · Foot abscess - PMH 2019; nonrecurrent. · Osteomyelitis - PMH 2019; nonrecurrent.      DISPOSITION   · Proxy agent - Sister Diana Peralta.   · Rescue advance directives - Full code

## 2023-01-01 NOTE — PLAN OF CARE
Ibuprofen scheduled for back pain. Referral sent to [facility] for rehab, pending acceptance. VSS, [place] cath in place. Plan to transfer to room 502, report given to [facility].     Problem: Patient Centered Care  Goal: Patient preferences are identifie injury  Description  INTERVENTIONS:  - Assess pt frequently for physical needs  - Identify cognitive and physical deficits and behaviors that affect risk of falls.   - [place] fall precautions as indicated by assessment.  - Educate pt/family on patient sa Statement Selected

## (undated) DEVICE — LOWER EXTREMITY: Brand: MEDLINE INDUSTRIES, INC.

## (undated) DEVICE — SUPER SPONGES,MEDIUM: Brand: KERLIX

## (undated) DEVICE — STERILE TETRA-FLEX CF, ELASTIC BANDAGE, 2" X 5.5YD: Brand: TETRA-FLEX™CF

## (undated) DEVICE — GAUZE PACKING IODOFORM 1/2X5

## (undated) DEVICE — STERILE TETRA-FLEX CF, ELASTIC BANDAGE, 4" X 5.5YD: Brand: TETRA-FLEX™CF

## (undated) DEVICE — PADDING CAST WYTEX 2\" STER

## (undated) DEVICE — OCCLUSIVE GAUZE STRIP OVERWRAP,3% BISMUTH TRIBROMOPHENATE IN PETROLATUM BLEND: Brand: XEROFORM

## (undated) DEVICE — DRAPE MN CARM

## (undated) DEVICE — BANDAGE ROLL,100% COTTON, 6 PLY, LARGE: Brand: KERLIX

## (undated) DEVICE — DRESSING CRTY CNFRM 3IN

## (undated) DEVICE — ABDOMINAL PAD: Brand: CURITY

## (undated) DEVICE — SOL  .9 1000ML BTL

## (undated) DEVICE — TRAY SKIN PREP PVP-1

## (undated) DEVICE — ZIMMER® STERILE DISPOSABLE TOURNIQUET CUFF WITH PLC, DUAL PORT, SINGLE BLADDER, 18 IN. (46 CM)

## (undated) DEVICE — SUTURE PROLENE 3-0 8687H

## (undated) DEVICE — GAUZE SPONGES,12 PLY: Brand: CURITY

## (undated) DEVICE — SUTURE PROLENE 2-0 CT-1

## (undated) DEVICE — GAMMEX® NON-LATEX PI ORTHO SIZE 7.5, STERILE POLYISOPRENE POWDER-FREE SURGICAL GLOVE: Brand: GAMMEX

## (undated) NOTE — IP AVS SNAPSHOT
Patient Demographics     Address  [place] [address] Phone  [phone] [facility])  [phone] [facility]      Emergency Contact(s)     Name Relation Home Work Leo Ballard 40      Allergies as of 8/25/2019  Review st Your medication list      TAKE these medications       Instructions Authorizing Provider Morning Afternoon Evening As Needed   Acidophilus/Pectin Caps  Commonly known as:  PROBIOTIC  Next dose due:   Tonight at bedtime       Take 1 capsule by mouth 2 (two) sodium chloride 0.9% SOLN 500 mL with Vancomycin HCl 500 MG SOLR 1,250 mg      Inject 1,250 mg into the vein daily for 24 days. Stop taking on:  9/18/2019   Salomon Rice MD, MD         vitamin C 1000 MG Tabs  Next dose due:   Tomorrow morning       Take 4, [id number] metRONIDAZOLE (FLAGYL) tab 500 mg 08/24/19 2210 Given      [id number] metRONIDAZOLE (FLAGYL) tab 500 mg 08/25/19 0627 Given      [id number] metRONIDAZOLE (FLAGYL) tab 500 mg 08/25/19 1550 Given      [id number] vancomycin HCl (FIRVANQ) 50 MG/ML oral Procedure Component Value Units Date/Time    Anaerobic Culture Once [[id number]] Collected:  08/19/19 1316    Order Status:  Completed Lab Status:  Final result Updated:  08/24/19 0565    Specimen:  Other from Foot, right      Anaerobic Culture No Anaerobe Anaerobic Culture Once [[id number]]  (Abnormal) Collected:  08/19/19 1316    Order Status:  Completed Lab Status:  Final result Updated:  08/22/19 1318    Specimen:  Other from Ankle, left      Anaerobic Culture 3+ growth Bacteroides ovatus      3+ g Author:  Maria G Amos MD Service:  Internal Medicine Author Type:  Physician    Filed:  8/20/2019 12:36 PM Date of Service:  8/19/2019  7:16 AM Status:  Addendum    :  Maria G Amos MD (Physician)    Related Notes:  Original Note by Read Bowels, Roby Simmonds Consult RD. Consider all options, up to and including supplemental tube feeding. [GM.2]   Funct ? Debility -[GM.1] Chronic, severe with acute worsening due to acute illness. Treat acute problems, anticipate SNF rehab. Continue supportive care. [GM.2]   In Author:  Ignacia Benton MD Service:  Internal Medicine Author Type:  Physician    Filed:  8/20/2019 12:34 PM Date of Service:  8/19/2019  7:16 AM Status:  Signed    :  Ignacia Benton MD (Physician)    Related Notes:  Addendum by Ignacia Benton MD (Phys feeding. [GM.2]   Funct ? Debility -[GM.1] Chronic, severe with acute worsening due to acute illness. Treat acute problems, anticipate SNF rehab. Continue supportive care. [GM.2]   Integ[GM.1] ? *  Skin ulcers - Chronic.   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 Filed:  2019  7:22 AM Date of Service:  2019  7:19 AM Status:  Signed    :  Lisandra Quevedo MD (Physician)         [facility]    Report of Consultation    [facility] Patient Status:  Inpatient    1/15/1950 MRN •  [MAR Hold] Acidophilus/Pectin (PROBIOTIC) CAPS 1 capsule, 1 capsule, Oral, BID  •  [MAR Hold] Heparin Sodium (Porcine) 5000 UNIT/ML injection 5,000 Units, 5,000 Units, Subcutaneous, 2 times per day  •  [MAR Hold] collagenase (SANTYL) 250 UNIT/GM ointmen rheumatoid factor presence (HCC)     Bedridden     Bradycardia     Acute cystitis without hematuria     Abscess of right foot      LARGE LEFT INGUINAL HERNIA  CHRONIC  WILL REPAIR ONCE RECOVERED FROM PRESENT ILLNESSES AND ONCE INFECTIONS ARE COMPLETELY CON Skin ulcer of both feet with fat layer exposed ([address]     Weakness generalized     Anemia, unspecified type     Dehydration     Cellulitis, unspecified cellulitis site     Rheumatoid arthritis, involving unspecified site, unspecified rheumatoid factor pr START taking these medications      Instructions Prescription details   collagenase 250 UNIT/GM Oint  Commonly known as:  SANTYL  Start taking on:  8/26/2019      Apply topically daily.    Refills:  0     Heparin Sodium (Porcine) 5000 UNIT/ML Soln      Inje These medications were sent to [address] [facility], [address] [facility] [address] [address] [address] [phone], [phone]  [address] [facility]    Phone:  [phone]   · metRONIDAZOLE 500 MG Tabs     Please  your prescript

## (undated) NOTE — IP AVS SNAPSHOT
Patient Demographics     Address  [place] [address] Phone  [phone] [facility])  [phone] [facility]      Emergency Contact(s)     Name Relation Home Work Merritt Stuart   [phone]      Allergies as of 9/3/2020  Revi Next dose due: Tomorrow morning      Take 4 tablets by mouth daily. Heparin Sodium (Porcine) 5000 UNIT/ML Soln  Next dose due: Tonight at 10pm      Inject 1 mL (5,000 Units total) into the skin every 8 (eight) hours.    Marilynn Carey MD, MD RIGHT UPPER ARM     Order ID Medication Name Action Time Action Reason Comments    [id number] Heparin Sodium (Porcine) 5000 UNIT/ML injection 5,000 Units 09/03/20 0624 Given      [id number] Heparin Sodium (Porcine) 5000 UNIT/ML injection 5,000 Units 09/03/2 Rehan JOAQUIN ([facility])   Narrative: The following orders were created for panel order CBC WITH DIFFERENTIAL WITH PLATELET.   Procedure                               Abnormality         Status                     --------- Order Status:  Completed Lab Status:  Final result Updated:  08/31/20 9203    Specimen:  Other from Nares      SARS-CoV-2 (COVID-19) Not Detected      Pending Labs     Order Current Status    ANAEROBIC CULTURE In process    AEROBIC BACTERIAL CULTURE Preli pain.  Pain got worse in the ensuing 24 hours. Seen in ER last night, where Dr. Frank Manning recorded that patient was unable to walk. Admitted observation for discharge planning. PMSH:  Reviewed. Medications:  Reviewed.      ROS: BP running low as usual. Similar lesions 1 year ago biopsied negative for vasculitis. Continue wound care. Resp ·     GI ·     Urinary · UTI - PMH 2019.    Genital ·     CV · Low BP - Typical for him. Probably worsened by recurrent dehydration. IVF. · Lizette Borden [address] 2019. · Long-term plan - Continue living as independently as possible with his sister Roberta Seo in their own home.  Darek Acuña MD  8/31/2020[GM.1]    Electronically signed by Mary Monsivais MD on 8/31/2020  7:40 PM   Attribution Elise    GM. 1 - Mary Monsivais MD is not interested in communication. Patient had x-ray done of the right foot which is showing presence of hardware in the foot with possible right hallux, distal phalanx, and 2nd through 5th metatarsal osteomyelitis.   MRI done today and did not show any o his dressing. Blood cultures are pending. MRI did not show any evidence of any osteomyelitis in the foot and the ankle or abscess. Based on the previous culture results, I will start him on meropenem. Will get new fresh wound cultures.   Await Podiatry BA.1 Carlita Jane on 9/3/2020 12:53 PM                     Video Swallow Study Notes    No notes of this type exist for this encounter. SLP Notes    No notes of this type exist for this encounter.      Multidisciplinary Problems     Active Goals

## (undated) NOTE — IP AVS SNAPSHOT
[facility]            (For Outpatient Use Only) Initial Admit Date: 11/2/2020   Inpt/Obs Admit Date: Inpt: N/A / Obs: 11/02/20   Discharge Date:    Hospital Acct:  [de-identified]   MRN: [de-identified]   CSN: [id number]   CEID: [id number]        EN Subscriber Name:  [facility] [address] :    Subscriber ID:  Pt Rel to Subscriber:    Hospital Account Financial Class: Medicare    November 3, 2020

## (undated) NOTE — IP AVS SNAPSHOT
Patient Demographics     Address  [place] [address] Phone  [phone] [facility])  [phone] [facility]      Emergency Contact(s)     Name Relation Home Work Merritt Stuart   [phone]      Allergies as of 9/26/2019  Rev 3. Limit your fluid intake to no more than 2 liters or 64 ounces per day    4. Some exercise and activity is important to help keep your heart functioning and strong.  Unless instructed not to exercise, you may walk at a slow to moderate pace for 10-15 richard Take 1 capsule by mouth 2 (two) times daily. B COMPLEX 50 OR  Next dose due: Tomorrow morning      Take 150 mg by mouth daily. Calcium-Magnesium-Zinc 500-250-12.5 MG Tabs  Next dose due:   Tomorrow morning      Take 3 tablets by mouth da Ordering provider:  Sandy Chavira DO  09/25/19 2300 Resulting lab:  [facility]    Specimen Information    Type Source Collected On   Blood — 09/26/19 0602          Components    Component Value Reference Range [facility] Lab   PTT 32.4 23.2 - 35.3 seconds — Total Protein 5.7 6.4 - 8.2 g/dL  [facility]   Albumin 2.1 3.4 - 5.0 g/dL  [facility]   Globulin  3.6 2.8 - 4.4 g/dL [facility]   A/G Ratio 0.6 1.0 - 2.0  [facility]            Testing Performed By     [employer] Name Director Address Teri Corona Started on IV heparin. [GM.3]   PMSH:  Reviewed. Medications:  Reviewed. ROS:[GM.1] Recent swelling left arm following insertion of P ICC.   N[GM.4]o report of fever, acute pain, skin breakdown or any acute respiratory, GI, genitourinary, cardiovascular, h extremity venous Doppler ultrasound examination. Echocardiogram.  C[GM.2]ontinue IV heparin, Coumadin in consultation with pulmonary Dr. Huy Reddy ·     Urinary[GM. 3] ·  UTI - Resolved. [GM.4]   Genital ·     CV · Bradycardia - Probably at least partly GM.2 - Maria G Amos MD on 9/24/2019 11:52 AM  GM. 3 - Maria G Amos MD on 9/24/2019 10:22 AM  GM. 4 - Maria G Amos MD on 9/24/2019 12:42 PM  GM. 5 - Maria G Amos MD on 9/26/2019  8:40 AM               H&P signed by Maria G Amos MD at 9/24/2019 12:45 PM  V extremity swelling. Coherent. Motor exam grossly nonfocal.  Normal affect and behavior. [GM.2]  Data:  Reports on results of[GM.1] CBC, BMP, troponin, D-dimer, BNP, INR, PTT, MRSA screen, ESR, CXR, chest CT angiogram[GM.3] 9/23/2019; all extremity venous All/Imm · Allergies - PCN, adhesive tape, Norco.  · RA - Late–stage. [GM.3]  Continue consultation with[GM.2] rheumatology[GM. 3] [GM.4]s[GM.2]. [GM.4] Patience[GM.2] for options.    Vision ·     Hearing · [facility] impairment [facility] ADMISSION DATE:       09/23/2019      CONSULT DATE:  09/25/2019    REPORT OF CONSULTATION    CHIEF COMPLAINT:  Bilateral hand and wrist rheumatoid deformity.     HISTORY OF PRESENT ILLNESS:  This is a 73-year-old, multiply disabled, right-handed gentleman w age 82 of coronary artery disease. SOCIAL HISTORY:  He is a never smoker. He does not drink. He uses a walker at home. PHYSICAL EXAMINATION:  Bilateral volar rheumatoid wrist dislocations.   The digits of his left hand fully extend and bilateral th hands and wrists, and attempt to obtain increased extension of his right long and ring finger PIP joints with nighttime Joint Jacks applied by Occupational Therapy would be recommended.   He was counseled that at this time he is not a surgical candidate bec Discharge Summary - D/C Summary      Discharge Summary signed by Lilton Severance, MD at 9/26/2019  8:32 AM  Version 1 of 1    Author:  Lilton Severance, MD Service:  Internal Medicine Author Type:  Physician    Filed:  9/26/2019  8:32 AM Date of Se past several days. Seen in ER where he was found to have pleural effusion thought to be due to heart failure. He was given IV diuretic and admitted inpatient for further management.   Seen by pulmonary Dr. Quiana Matthews who ordered D-dimer and chest CT angiogram, Calcium-Magnesium-Zinc 500-250-12.5 MG Tabs      Take 3 tablets by mouth daily. Refills:  0     Glucosamine Sulfate 500 MG Tabs      Take 4 tablets by mouth daily.    Refills:  0     Levothyroxine Sodium 50 MCG Tabs  Commonly known as:  Jose Jeronimo CONCLUSION:   Pulmonary edema with right greater than left pleural effusions and associated airspace disease.   New crescentic lucency corresponding to the lateral aspect of the right minor fissure, potentially reflecting improved aeration although small ri atelectasis.  Underlying pneumonia is felt to be less likely but is also in the differential.   Dictated by (CST): Heaven Acosta MD on 9/23/2019 at 18:42     Approved by (CST): Heaven Acosta MD on 9/23/2019 at 18:44              2D Echocardiogram Results (HF right ventricular systolic pressure was    increased consistent with mild pulmonary hypertension. ------------------------------------------------------------------- Study data:  Comparison was made to the study of 08/20/2019. Study status:  Elective.   Pr valve appears to be grossly normal.    Doppler: There was no evidence for stenosis. Mild regurgitation. Tricuspid valve:   Structurally normal valve. Mobility was not restricted. Doppler: There was no evidence for stenosis. Moderate regurgitation. cm/sec 8.68       -----------  LV E/e&apos;, medial                4.5          6.1        -----------  LV e&apos;, average                 8.6   cm/sec 10.1       -----------  LV E/e&apos;, average               4            5          -----------   Nivia Pacheco -----------   Right ventricle                Value        08/20/2019 Reference  RV ID, ED, PLAX                3.1   cm     ---------- -----------  RV pressure, S, DP             60    mm Hg  49         -----------   Pulmonic valve                 Value Pt received supine in bed on 3L O2 finishing discussion with SW, agreeable to therapy. PCT present at start of session to provide supervision as needed. Pt reports he was at sub-acute rehab for last month after admission for R foot wound.  His goal is to ev education;Gait training;Strengthening;Transfer training;Balance training  Rehab Potential : Fair  Frequency (Obs): (3-5x/week)       PHYSICAL THERAPY MEDICAL/SOCIAL HISTORY     Problem List  Principal Problem:    Hypoxia  Active Problems:    Acute on chron finger/hand flexion contractures, R elbow flexion contracture    Lower extremity ROM is within functional limits     Lower extremity strength is functionally weak, at least 3/5 grossly BLEs    BALANCE  Static Sitting: Fair  Dynamic Sitting: Fair -  Static Patient Goal Patient's self-stated goal is: to be able to walk 150' again; be independent again    Goal #1 Patient is able to demonstrate supine - sit EOB @ level: minimum assistance     Goal #1   Current Status    Goal #2 Patient is able to demonstrate tr

## (undated) NOTE — IP AVS SNAPSHOT
Patient Demographics     Address  [place] [address] Phone  [phone] [facility])  [phone] [facility]      Emergency Contact(s)     Name Relation Home Work Merritt Stuart   [phone]      Allergies as of 8/25/2020  Rev Take 1 capsule (500 mg total) by mouth 3 (three) times daily for 3 days. Stop taking on:  August 28, 2020   Yi Sagastume MD, MD         Glucosamine Sulfate 500 MG Tabs  Next dose due: Tomorrow 9 am      Take 4 tablets by mouth daily.           Levothyrox SpO2  95 % Filed at 08/25/2020 1409      Patient's Most Recent Weight       Most Recent Value   Patient Weight  62.8 kg (138 lb 6.4 oz)      Lab Results Last 24 Hours    No matching results found     Microbiology Results (All)     Procedure Component Value ROS:  No report of fever, acute pain, skin breakdown or any acute respiratory, GI, genitourinary, cardiovascular, hematologic, endocrine, neurologic or musculoskeletal symptoms except as noted above in CC/HPI. EXAMINATION   PX: Alert.   No acute distress · RA -Aristeo Crooked rheumatology for options. Vision ·     Hearing · Hearing impairment - Mild–moderate.  Hearing functional.  Further intervention on request as indicated.    Neuro ·     Psych ·     MS · Hernia - Left inguinal.  Not incarcerated :  Richard Mclaughlin PT (Physical Therapist)        PHYSICAL THERAPY EVALUATION - INPATIENT     Room Number: 556/556-A  Evaluation Date: 8/24/2020  Type of Evaluation: Initial   Physician Order: PT Eval and Treat    Presenting Problem: RLE celluli and wants to return home to be as independent as possible. Spoke with RN and MD about recommendation and what patient wants for D/C.  Patient ultimately not safe for home at this time, and would benefit from rehab, however patient has concerns about going t Patient Owned Equipment: Rolling walker(wheelchair)  Patient Regularly Uses: None    Prior Level of [place]: Patient reports being independent in ADL's and ambulation with rolling walker prior to admission to the hospital. Patient has the firepeople b Transfer training    Patient End of Session: Up in chair;Call light within reach; Needs met;RN aware of session/findings; All patient questions and concerns addressed; Alarm set    CURRENT GOALS    Goals to be met by: 9/5/20  Patient Goal Patient's self-state gauze dressing. Per EMR, maggots were found in pt's wound. Pt has a h/o RA and scoliosis with chronic deformities in his hands and spine. He is very pleasant and cooperative. Pt is oriented x3. Pt reports some pain in his feet when touched.  PPE worn by OT based on documentation in the [facility] '6 clicks' Inpatient Daily Activity Short Form. Research supports that patients with this level of impairment may benefit from [facility].       DISCHARGE RECOMMENDATIONS  OT Discharge Recommendations: Sub-acute rehabilitation; \"I think I'll be fine to go home. I was only supposed to be here 1 day. \"    OCCUPATIONAL THERAPY EXAMINATION      OBJECTIVE  Precautions: Bed/chair alarm;Limb alert - right(dressing to R foot, maggots found in wound)  Fall Risk: High fall risk    PAIN AS Sit to Stand:  Moderate assistance(Mod A x2)  Toilet Transfer: NT  Shower Transfer: NT  Chair Transfer: NT (Mod A x2 sit to stand from bed height)    Bedroom Mobility: NT    BALANCE ASSESSMENT  Static Sitting: fair  Dynamic Sitting: poor  Static Standing: p - See additional Care Plan goals for specific interventions

## (undated) NOTE — IP AVS SNAPSHOT
[facility]            (For Outpatient Use Only) Initial Admit Date: 8/30/2020   Inpt/Obs Admit Date: Inpt: N/A / Obs: 08/31/20   Discharge Date:    Hospital Acct:  [de-identified]   MRN: [de-identified]   CSN: [id number]   CEID: [id number]        EN Subscriber Name:  Mere Woo :    Subscriber ID:  Pt Rel to Subscriber:    Hospital Account Financial Class: Medicare    September 3, 2020

## (undated) NOTE — IP AVS SNAPSHOT
[facility]            (For Outpatient Use Only) Initial Admit Date: 8/18/2019   Inpt/Obs Admit Date: Inpt: 8/18/19 / Obs: N/A   Discharge Date:    Link Pattee:  [de-identified]   MRN: [de-identified]   CSN: [id number]   CEID: [id number]        [facility] Hospital Account Financial Class: Medicare    August 25, 2019

## (undated) NOTE — IP AVS SNAPSHOT
Patient Demographics     Address  [address] Phone  [phone] [facility])  [phone] (Mobile) *Preferred*      Emergency Contact(s)     Name Relation Home Work Merritt Sister   [phone]      Allergies as of 11 Take 1 tablet (25 mcg total) by mouth before breakfast.   Angie Damon MD, MD         vitamin C 1000 MG Tabs  Next dose due: 11/4/2020      Take 2,000 mg by mouth daily.           Vitamin D 1000 units Tabs  Next dose due: 11/4/2020      Take 2,000 Units by Specimen Information    Type Source Collected On   Blood — 11/03/20 0542          Components    Component Value Reference Range Flag Lab   Glucose 66 70 - 99 mg/dL [facility])   Sodium 150 136 - 145 mmol/L H [facility] (Critical access hospital)   Potassium 4.3 3.5 TSH 5.830 0.358 - 3.740 mIU/mL H [facility])   Comment:         This test may exhibit interference when a sample is collected from a person who is consuming high dose of biotin (a.k.a., vitamin B7, vitamin H, coenzyme R) supplements resulting in seru Location [address] [place] [address] Attending Beata Casper MD   Hosp Day # 0 PCP Deborah Shay MD, MD     Patient seen and examined 11/2/2020. Chart reviewed.   Discussed with ER nurse Precious Viera, Dr. Gera Swift, and Edinson Bloom Integ · Skin ulcers[GM.1] - Chronic. Likely due to venous edema. Control edema and prevent recurrence. Wound care, including close monitoring for infection. [GM.2]   Resp ·    GI ·    Urinary[GM. 1] · *[GM.2]  Hematuria[GM.1] - Acute.   With dysuria, incre · Rescue advance directives -[GM.1] Full code documented on POLST form 8/19/2019. [GM.2]  · Short-term plan -[GM.1] [facility] at [facility]. [GM.2]  · Long-term plan -[GM.1] Continue residence in his own home when he is able to meet personal care needs t Malnutrition ([address]     Acute deep vein thrombosis (DVT) of axillary vein of both upper extremities (HCC)     Pulmonary embolism (HCC)     Cellulitis of right leg     Baker's cyst of knee     Acute cystitis with hematuria      Reason for Admission: acute Commonly known as: SANTYL      Apply 1 Application topically daily. Refills: 0     Glucosamine Sulfate 500 MG Tabs      Take 4 tablets by mouth daily.    Refills: 0     ibuprofen 400 MG Tabs  Commonly known as: MOTRIN      Take 1 tablet (400 mg total) by No notes of this type exist for this encounter.      Multidisciplinary Problems     Active Goals        Problem: Patient/Family Goals    Goal Priority Disciplines Outcome Interventions   Patient/Family Long Term Goal     Interdisciplinary Progressing    Rasheed

## (undated) NOTE — IP AVS SNAPSHOT
[facility]            (For Outpatient Use Only) Initial Admit Date: 8/22/2020   Inpt/Obs Admit Date: Inpt: 8/22/20 / Obs: N/A   Discharge Date:    Huel Curling:  [de-identified]   MRN: [de-identified]   CSN: [id number]   CEID: [id number]        [facility] Subscriber Name:  Jes Vieira :    Subscriber ID:  Pt Rel to Subscriber:    Hospital Account Financial Class: Medicare    2020

## (undated) NOTE — IP AVS SNAPSHOT
[facility]            (For Outpatient Use Only) Initial Admit Date: 9/23/2019   Inpt/Obs Admit Date: Inpt: 9/23/19 / Obs: N/A   Discharge Date:    Palma Cleaning:  [de-identified]   MRN: [de-identified]   CSN: [id number]   CEID: [id number]        [facility] Hospital Account Financial Class: Medicare    September 26, 2019